# Patient Record
Sex: FEMALE | Race: WHITE | NOT HISPANIC OR LATINO | Employment: FULL TIME | URBAN - METROPOLITAN AREA
[De-identification: names, ages, dates, MRNs, and addresses within clinical notes are randomized per-mention and may not be internally consistent; named-entity substitution may affect disease eponyms.]

---

## 2017-05-29 ENCOUNTER — HOSPITAL ENCOUNTER (INPATIENT)
Facility: HOSPITAL | Age: 27
LOS: 2 days | Discharge: HOME | DRG: 050 | End: 2017-06-01
Attending: EMERGENCY MEDICINE | Admitting: INTERNAL MEDICINE
Payer: COMMERCIAL

## 2017-05-29 ENCOUNTER — APPOINTMENT (OUTPATIENT)
Dept: CT IMAGING | Facility: HOSPITAL | Age: 27
DRG: 050 | End: 2017-05-29
Payer: COMMERCIAL

## 2017-05-29 DIAGNOSIS — R51.9 HEADACHE: ICD-10-CM

## 2017-05-29 DIAGNOSIS — M43.6 NECK STIFFNESS: ICD-10-CM

## 2017-05-29 DIAGNOSIS — A41.9 SEPSIS (HCC): Primary | ICD-10-CM

## 2017-05-29 PROBLEM — R50.9 FEVER: Status: ACTIVE | Noted: 2017-05-29

## 2017-05-29 LAB
ALBUMIN SERPL BCP-MCNC: 3.6 G/DL (ref 3.5–5)
ALP SERPL-CCNC: 52 U/L (ref 46–116)
ALT SERPL W P-5'-P-CCNC: 17 U/L (ref 12–78)
ANION GAP SERPL CALCULATED.3IONS-SCNC: 13 MMOL/L (ref 4–13)
APPEARANCE CSF: NORMAL
AST SERPL W P-5'-P-CCNC: 15 U/L (ref 5–45)
ATRIAL RATE: 84 BPM
BASOPHILS # BLD AUTO: 0.01 THOUSANDS/ΜL (ref 0–0.1)
BASOPHILS NFR BLD AUTO: 0 % (ref 0–1)
BILIRUB SERPL-MCNC: 0.3 MG/DL (ref 0.2–1)
BUN SERPL-MCNC: 8 MG/DL (ref 5–25)
CALCIUM SERPL-MCNC: 8.8 MG/DL (ref 8.3–10.1)
CHLORIDE SERPL-SCNC: 104 MMOL/L (ref 100–108)
CLARITY, POC: CLEAR
CO2 SERPL-SCNC: 20 MMOL/L (ref 21–32)
COLOR, POC: YELLOW
CREAT SERPL-MCNC: 0.81 MG/DL (ref 0.6–1.3)
EOSINOPHIL # BLD AUTO: 0 THOUSAND/ΜL (ref 0–0.61)
EOSINOPHIL NFR BLD AUTO: 0 % (ref 0–6)
ERYTHROCYTE [DISTWIDTH] IN BLOOD BY AUTOMATED COUNT: 11.6 % (ref 11.6–15.1)
EXT BILIRUBIN, UA: NEGATIVE
EXT BLOOD URINE: NEGATIVE
EXT GLUCOSE, UA: NEGATIVE
EXT KETONES: NEGATIVE
EXT NITRITE, UA: NEGATIVE
EXT PH, UA: 6
EXT PROTEIN, UA: NEGATIVE
EXT SPECIFIC GRAVITY, UA: 1.01
EXT UROBILINOGEN: 0.2
FLUAV AG SPEC QL IA: NEGATIVE
FLUBV AG SPEC QL IA: NEGATIVE
GFR SERPL CREATININE-BSD FRML MDRD: >60 ML/MIN/1.73SQ M
GLUCOSE CSF-MCNC: 67 MG/DL (ref 50–80)
GLUCOSE SERPL-MCNC: 105 MG/DL (ref 65–140)
GRAM STN SPEC: NORMAL
HCG UR QL: NEGATIVE
HCT VFR BLD AUTO: 38.1 % (ref 34.8–46.1)
HGB BLD-MCNC: 12.8 G/DL (ref 11.5–15.4)
LACTATE SERPL-SCNC: 0.4 MMOL/L (ref 0.5–2)
LIPASE SERPL-CCNC: 99 U/L (ref 73–393)
LYMPHOCYTES # BLD AUTO: 0.67 THOUSANDS/ΜL (ref 0.6–4.47)
LYMPHOCYTES NFR BLD AUTO: 19 % (ref 14–44)
LYMPHOCYTES NFR CSF MANUAL: 50 %
MCH RBC QN AUTO: 29.5 PG (ref 26.8–34.3)
MCHC RBC AUTO-ENTMCNC: 33.6 G/DL (ref 31.4–37.4)
MCV RBC AUTO: 88 FL (ref 82–98)
MONOCYTES # BLD AUTO: 0.49 THOUSAND/ΜL (ref 0.17–1.22)
MONOCYTES NFR BLD AUTO: 14 % (ref 4–12)
NEUTROPHILS # BLD AUTO: 2.4 THOUSANDS/ΜL (ref 1.85–7.62)
NEUTROPHILS NFR CSF MANUAL: 50 %
NEUTS SEG NFR BLD AUTO: 67 % (ref 43–75)
NRBC BLD AUTO-RTO: 0 /100 WBCS
P AXIS: 68 DEGREES
PLATELET # BLD AUTO: 211 THOUSANDS/UL (ref 149–390)
PMV BLD AUTO: 8.5 FL (ref 8.9–12.7)
POTASSIUM SERPL-SCNC: 3.5 MMOL/L (ref 3.5–5.3)
PR INTERVAL: 140 MS
PROT CSF-MCNC: 23 MG/DL (ref 15–45)
PROT SERPL-MCNC: 6.8 G/DL (ref 6.4–8.2)
QRS AXIS: 77 DEGREES
QRSD INTERVAL: 86 MS
QT INTERVAL: 386 MS
QTC INTERVAL: 456 MS
RBC # BLD AUTO: 4.34 MILLION/UL (ref 3.81–5.12)
RBC # CSF MANUAL: 0 UL (ref 0–10)
SODIUM SERPL-SCNC: 137 MMOL/L (ref 136–145)
T WAVE AXIS: 56 DEGREES
TOTAL CELLS COUNTED BLD: NO
TOTAL CELLS COUNTED SPEC: 2
TUBE # CSF: 4
VENTRICULAR RATE: 84 BPM
WBC # BLD AUTO: 3.58 THOUSAND/UL (ref 4.31–10.16)
WBC # BLD EST: NEGATIVE 10*3/UL
WBC # CSF AUTO: 1 /UL (ref 0–5)

## 2017-05-29 PROCEDURE — 83690 ASSAY OF LIPASE: CPT | Performed by: EMERGENCY MEDICINE

## 2017-05-29 PROCEDURE — 96365 THER/PROPH/DIAG IV INF INIT: CPT

## 2017-05-29 PROCEDURE — 87070 CULTURE OTHR SPECIMN AEROBIC: CPT | Performed by: EMERGENCY MEDICINE

## 2017-05-29 PROCEDURE — 36415 COLL VENOUS BLD VENIPUNCTURE: CPT | Performed by: EMERGENCY MEDICINE

## 2017-05-29 PROCEDURE — 87529 HSV DNA AMP PROBE: CPT | Performed by: EMERGENCY MEDICINE

## 2017-05-29 PROCEDURE — 84157 ASSAY OF PROTEIN OTHER: CPT | Performed by: EMERGENCY MEDICINE

## 2017-05-29 PROCEDURE — 99285 EMERGENCY DEPT VISIT HI MDM: CPT

## 2017-05-29 PROCEDURE — 70450 CT HEAD/BRAIN W/O DYE: CPT

## 2017-05-29 PROCEDURE — 87653 STREP B DNA AMP PROBE: CPT | Performed by: EMERGENCY MEDICINE

## 2017-05-29 PROCEDURE — 87798 DETECT AGENT NOS DNA AMP: CPT | Performed by: EMERGENCY MEDICINE

## 2017-05-29 PROCEDURE — 96367 TX/PROPH/DG ADDL SEQ IV INF: CPT

## 2017-05-29 PROCEDURE — 87040 BLOOD CULTURE FOR BACTERIA: CPT | Performed by: EMERGENCY MEDICINE

## 2017-05-29 PROCEDURE — 87400 INFLUENZA A/B EACH AG IA: CPT | Performed by: EMERGENCY MEDICINE

## 2017-05-29 PROCEDURE — 96375 TX/PRO/DX INJ NEW DRUG ADDON: CPT

## 2017-05-29 PROCEDURE — 87496 CYTOMEG DNA AMP PROBE: CPT | Performed by: EMERGENCY MEDICINE

## 2017-05-29 PROCEDURE — 87532 HHV-6 DNA AMP PROBE: CPT | Performed by: EMERGENCY MEDICINE

## 2017-05-29 PROCEDURE — 009U3ZX DRAINAGE OF SPINAL CANAL, PERCUTANEOUS APPROACH, DIAGNOSTIC: ICD-10-PCS | Performed by: EMERGENCY MEDICINE

## 2017-05-29 PROCEDURE — 89050 BODY FLUID CELL COUNT: CPT | Performed by: EMERGENCY MEDICINE

## 2017-05-29 PROCEDURE — 82945 GLUCOSE OTHER FLUID: CPT | Performed by: EMERGENCY MEDICINE

## 2017-05-29 PROCEDURE — 85025 COMPLETE CBC W/AUTO DIFF WBC: CPT | Performed by: EMERGENCY MEDICINE

## 2017-05-29 PROCEDURE — 81025 URINE PREGNANCY TEST: CPT | Performed by: EMERGENCY MEDICINE

## 2017-05-29 PROCEDURE — 81002 URINALYSIS NONAUTO W/O SCOPE: CPT | Performed by: EMERGENCY MEDICINE

## 2017-05-29 PROCEDURE — 93005 ELECTROCARDIOGRAM TRACING: CPT | Performed by: EMERGENCY MEDICINE

## 2017-05-29 PROCEDURE — 83605 ASSAY OF LACTIC ACID: CPT | Performed by: EMERGENCY MEDICINE

## 2017-05-29 PROCEDURE — 80053 COMPREHEN METABOLIC PANEL: CPT | Performed by: EMERGENCY MEDICINE

## 2017-05-29 PROCEDURE — 89051 BODY FLUID CELL COUNT: CPT | Performed by: EMERGENCY MEDICINE

## 2017-05-29 PROCEDURE — 87498 ENTEROVIRUS PROBE&REVRS TRNS: CPT | Performed by: EMERGENCY MEDICINE

## 2017-05-29 RX ORDER — KETOROLAC TROMETHAMINE 30 MG/ML
15 INJECTION, SOLUTION INTRAMUSCULAR; INTRAVENOUS ONCE
Status: COMPLETED | OUTPATIENT
Start: 2017-05-29 | End: 2017-05-29

## 2017-05-29 RX ORDER — CALCIUM CARBONATE 200(500)MG
1000 TABLET,CHEWABLE ORAL DAILY PRN
Status: DISCONTINUED | OUTPATIENT
Start: 2017-05-29 | End: 2017-06-01 | Stop reason: HOSPADM

## 2017-05-29 RX ORDER — IBUPROFEN 400 MG/1
TABLET ORAL
Status: COMPLETED
Start: 2017-05-29 | End: 2017-05-29

## 2017-05-29 RX ORDER — DIPHENHYDRAMINE HYDROCHLORIDE 50 MG/ML
25 INJECTION INTRAMUSCULAR; INTRAVENOUS ONCE
Status: COMPLETED | OUTPATIENT
Start: 2017-05-29 | End: 2017-05-29

## 2017-05-29 RX ORDER — VANCOMYCIN HYDROCHLORIDE 1 G/200ML
15 INJECTION, SOLUTION INTRAVENOUS ONCE
Status: COMPLETED | OUTPATIENT
Start: 2017-05-29 | End: 2017-05-29

## 2017-05-29 RX ORDER — MORPHINE SULFATE 4 MG/ML
INJECTION, SOLUTION INTRAMUSCULAR; INTRAVENOUS
Status: COMPLETED
Start: 2017-05-29 | End: 2017-05-29

## 2017-05-29 RX ORDER — LIDOCAINE HYDROCHLORIDE 20 MG/ML
10 INJECTION, SOLUTION EPIDURAL; INFILTRATION; INTRACAUDAL; PERINEURAL ONCE
Status: COMPLETED | OUTPATIENT
Start: 2017-05-29 | End: 2017-05-29

## 2017-05-29 RX ORDER — ONDANSETRON 2 MG/ML
4 INJECTION INTRAMUSCULAR; INTRAVENOUS EVERY 6 HOURS PRN
Status: DISCONTINUED | OUTPATIENT
Start: 2017-05-29 | End: 2017-06-01 | Stop reason: HOSPADM

## 2017-05-29 RX ORDER — MIDAZOLAM HYDROCHLORIDE 1 MG/ML
2 INJECTION INTRAMUSCULAR; INTRAVENOUS ONCE
Status: COMPLETED | OUTPATIENT
Start: 2017-05-29 | End: 2017-05-29

## 2017-05-29 RX ORDER — MIDAZOLAM HYDROCHLORIDE 1 MG/ML
INJECTION INTRAMUSCULAR; INTRAVENOUS
Status: COMPLETED
Start: 2017-05-29 | End: 2017-05-29

## 2017-05-29 RX ORDER — ACETAMINOPHEN 325 MG/1
650 TABLET ORAL EVERY 6 HOURS PRN
Status: DISCONTINUED | OUTPATIENT
Start: 2017-05-29 | End: 2017-05-31

## 2017-05-29 RX ORDER — IBUPROFEN 400 MG/1
400 TABLET ORAL ONCE
Status: COMPLETED | OUTPATIENT
Start: 2017-05-29 | End: 2017-05-29

## 2017-05-29 RX ORDER — VANCOMYCIN HYDROCHLORIDE 1 G/200ML
15 INJECTION, SOLUTION INTRAVENOUS EVERY 12 HOURS
Status: DISCONTINUED | OUTPATIENT
Start: 2017-05-30 | End: 2017-05-30

## 2017-05-29 RX ORDER — MORPHINE SULFATE 10 MG/ML
6 INJECTION, SOLUTION INTRAMUSCULAR; INTRAVENOUS ONCE
Status: COMPLETED | OUTPATIENT
Start: 2017-05-29 | End: 2017-05-29

## 2017-05-29 RX ORDER — IBUPROFEN 400 MG/1
400 TABLET ORAL EVERY 6 HOURS PRN
Status: DISCONTINUED | OUTPATIENT
Start: 2017-05-29 | End: 2017-05-30

## 2017-05-29 RX ORDER — FENTANYL CITRATE 50 UG/ML
INJECTION, SOLUTION INTRAMUSCULAR; INTRAVENOUS
Status: DISCONTINUED
Start: 2017-05-29 | End: 2017-05-29 | Stop reason: WASHOUT

## 2017-05-29 RX ORDER — SODIUM CHLORIDE 9 MG/ML
100 INJECTION, SOLUTION INTRAVENOUS CONTINUOUS
Status: DISCONTINUED | OUTPATIENT
Start: 2017-05-29 | End: 2017-05-31

## 2017-05-29 RX ORDER — ACETAMINOPHEN 325 MG/1
975 TABLET ORAL ONCE
Status: COMPLETED | OUTPATIENT
Start: 2017-05-29 | End: 2017-05-29

## 2017-05-29 RX ORDER — MORPHINE SULFATE 2 MG/ML
2 INJECTION, SOLUTION INTRAMUSCULAR; INTRAVENOUS
Status: DISCONTINUED | OUTPATIENT
Start: 2017-05-29 | End: 2017-05-30

## 2017-05-29 RX ORDER — ACETAMINOPHEN 325 MG/1
650 TABLET ORAL EVERY 6 HOURS PRN
Status: DISCONTINUED | OUTPATIENT
Start: 2017-05-29 | End: 2017-05-29

## 2017-05-29 RX ORDER — MORPHINE SULFATE 2 MG/ML
INJECTION, SOLUTION INTRAMUSCULAR; INTRAVENOUS
Status: COMPLETED
Start: 2017-05-29 | End: 2017-05-29

## 2017-05-29 RX ORDER — DOCUSATE SODIUM 100 MG/1
100 CAPSULE, LIQUID FILLED ORAL 2 TIMES DAILY
Status: DISCONTINUED | OUTPATIENT
Start: 2017-05-30 | End: 2017-06-01 | Stop reason: HOSPADM

## 2017-05-29 RX ADMIN — SODIUM CHLORIDE 125 ML/HR: 0.9 INJECTION, SOLUTION INTRAVENOUS at 23:29

## 2017-05-29 RX ADMIN — LIDOCAINE HYDROCHLORIDE 10 ML: 20 INJECTION, SOLUTION EPIDURAL; INFILTRATION; INTRACAUDAL; PERINEURAL at 20:08

## 2017-05-29 RX ADMIN — KETOROLAC TROMETHAMINE 15 MG: 30 INJECTION, SOLUTION INTRAMUSCULAR at 19:20

## 2017-05-29 RX ADMIN — MORPHINE SULFATE 2 MG: 2 INJECTION, SOLUTION INTRAMUSCULAR; INTRAVENOUS at 20:37

## 2017-05-29 RX ADMIN — IBUPROFEN 400 MG: 400 TABLET ORAL at 18:32

## 2017-05-29 RX ADMIN — CEFTRIAXONE 1000 MG: 1 INJECTION, POWDER, FOR SOLUTION INTRAMUSCULAR; INTRAVENOUS at 20:56

## 2017-05-29 RX ADMIN — DIPHENHYDRAMINE HYDROCHLORIDE 25 MG: 50 INJECTION, SOLUTION INTRAMUSCULAR; INTRAVENOUS at 22:31

## 2017-05-29 RX ADMIN — MIDAZOLAM HYDROCHLORIDE 2 MG: 1 INJECTION INTRAMUSCULAR; INTRAVENOUS at 20:38

## 2017-05-29 RX ADMIN — ONDANSETRON 4 MG: 2 INJECTION INTRAMUSCULAR; INTRAVENOUS at 23:28

## 2017-05-29 RX ADMIN — MORPHINE SULFATE: 4 INJECTION, SOLUTION INTRAMUSCULAR; INTRAVENOUS at 20:37

## 2017-05-29 RX ADMIN — MORPHINE SULFATE 6 MG: 10 INJECTION, SOLUTION INTRAMUSCULAR; INTRAVENOUS at 20:39

## 2017-05-29 RX ADMIN — MIDAZOLAM 2 MG: 1 INJECTION INTRAMUSCULAR; INTRAVENOUS at 20:38

## 2017-05-29 RX ADMIN — ACETAMINOPHEN 975 MG: 325 TABLET ORAL at 19:21

## 2017-05-29 RX ADMIN — VANCOMYCIN HYDROCHLORIDE 1000 MG: 1 INJECTION, SOLUTION INTRAVENOUS at 21:38

## 2017-05-30 LAB
ANION GAP SERPL CALCULATED.3IONS-SCNC: 8 MMOL/L (ref 4–13)
BUN SERPL-MCNC: 11 MG/DL (ref 5–25)
CALCIUM SERPL-MCNC: 8.5 MG/DL (ref 8.3–10.1)
CHLORIDE SERPL-SCNC: 109 MMOL/L (ref 100–108)
CO2 SERPL-SCNC: 24 MMOL/L (ref 21–32)
CREAT SERPL-MCNC: 0.82 MG/DL (ref 0.6–1.3)
ERYTHROCYTE [DISTWIDTH] IN BLOOD BY AUTOMATED COUNT: 11.8 % (ref 11.6–15.1)
FLUAV AG SPEC QL: NORMAL
FLUBV AG SPEC QL: NORMAL
GFR SERPL CREATININE-BSD FRML MDRD: >60 ML/MIN/1.73SQ M
GLUCOSE P FAST SERPL-MCNC: 104 MG/DL (ref 65–99)
GLUCOSE SERPL-MCNC: 104 MG/DL (ref 65–140)
HCT VFR BLD AUTO: 38.6 % (ref 34.8–46.1)
HGB BLD-MCNC: 12.7 G/DL (ref 11.5–15.4)
MCH RBC QN AUTO: 30.2 PG (ref 26.8–34.3)
MCHC RBC AUTO-ENTMCNC: 32.9 G/DL (ref 31.4–37.4)
MCV RBC AUTO: 92 FL (ref 82–98)
PLATELET # BLD AUTO: 160 THOUSANDS/UL (ref 149–390)
PMV BLD AUTO: 8.6 FL (ref 8.9–12.7)
POTASSIUM SERPL-SCNC: 4.2 MMOL/L (ref 3.5–5.3)
RBC # BLD AUTO: 4.21 MILLION/UL (ref 3.81–5.12)
RSV B RNA SPEC QL NAA+PROBE: NORMAL
SODIUM SERPL-SCNC: 141 MMOL/L (ref 136–145)
WBC # BLD AUTO: 2.93 THOUSAND/UL (ref 4.31–10.16)

## 2017-05-30 PROCEDURE — 85027 COMPLETE CBC AUTOMATED: CPT | Performed by: PHYSICIAN ASSISTANT

## 2017-05-30 PROCEDURE — 80048 BASIC METABOLIC PNL TOTAL CA: CPT | Performed by: PHYSICIAN ASSISTANT

## 2017-05-30 RX ORDER — DIPHENHYDRAMINE HYDROCHLORIDE 50 MG/ML
25 INJECTION INTRAMUSCULAR; INTRAVENOUS ONCE
Status: COMPLETED | OUTPATIENT
Start: 2017-05-30 | End: 2017-05-30

## 2017-05-30 RX ORDER — MORPHINE SULFATE 2 MG/ML
2 INJECTION, SOLUTION INTRAMUSCULAR; INTRAVENOUS EVERY 6 HOURS PRN
Status: DISCONTINUED | OUTPATIENT
Start: 2017-05-30 | End: 2017-05-31

## 2017-05-30 RX ORDER — KETOROLAC TROMETHAMINE 30 MG/ML
15 INJECTION, SOLUTION INTRAMUSCULAR; INTRAVENOUS EVERY 6 HOURS PRN
Status: DISCONTINUED | OUTPATIENT
Start: 2017-05-30 | End: 2017-05-31

## 2017-05-30 RX ADMIN — ONDANSETRON 4 MG: 2 INJECTION INTRAMUSCULAR; INTRAVENOUS at 17:35

## 2017-05-30 RX ADMIN — ENOXAPARIN SODIUM 40 MG: 40 INJECTION SUBCUTANEOUS at 09:08

## 2017-05-30 RX ADMIN — CEFTRIAXONE 1000 MG: 1 INJECTION, POWDER, FOR SOLUTION INTRAMUSCULAR; INTRAVENOUS at 21:32

## 2017-05-30 RX ADMIN — SODIUM CHLORIDE 100 ML/HR: 0.9 INJECTION, SOLUTION INTRAVENOUS at 21:32

## 2017-05-30 RX ADMIN — IBUPROFEN 400 MG: 400 TABLET ORAL at 05:43

## 2017-05-30 RX ADMIN — MORPHINE SULFATE 2 MG: 2 INJECTION, SOLUTION INTRAMUSCULAR; INTRAVENOUS at 21:41

## 2017-05-30 RX ADMIN — DOCUSATE SODIUM 100 MG: 100 CAPSULE, LIQUID FILLED ORAL at 17:35

## 2017-05-30 RX ADMIN — DIPHENHYDRAMINE HYDROCHLORIDE 25 MG: 50 INJECTION, SOLUTION INTRAMUSCULAR; INTRAVENOUS at 22:55

## 2017-05-30 RX ADMIN — ONDANSETRON 4 MG: 2 INJECTION INTRAMUSCULAR; INTRAVENOUS at 08:24

## 2017-05-30 RX ADMIN — ACETAMINOPHEN 650 MG: 325 TABLET ORAL at 07:26

## 2017-05-30 RX ADMIN — KETOROLAC TROMETHAMINE 15 MG: 30 INJECTION, SOLUTION INTRAMUSCULAR at 17:34

## 2017-05-30 RX ADMIN — SODIUM CHLORIDE 125 ML/HR: 0.9 INJECTION, SOLUTION INTRAVENOUS at 07:45

## 2017-05-30 RX ADMIN — VANCOMYCIN HYDROCHLORIDE 1500 MG: 1 INJECTION, POWDER, LYOPHILIZED, FOR SOLUTION INTRAVENOUS at 22:56

## 2017-05-30 RX ADMIN — DIPHENHYDRAMINE HYDROCHLORIDE 25 MG: 50 INJECTION, SOLUTION INTRAMUSCULAR; INTRAVENOUS at 09:39

## 2017-05-30 RX ADMIN — ACETAMINOPHEN 650 MG: 325 TABLET ORAL at 14:08

## 2017-05-30 RX ADMIN — VANCOMYCIN HYDROCHLORIDE 1500 MG: 1 INJECTION, POWDER, LYOPHILIZED, FOR SOLUTION INTRAVENOUS at 09:07

## 2017-05-31 ENCOUNTER — APPOINTMENT (INPATIENT)
Dept: MRI IMAGING | Facility: HOSPITAL | Age: 27
DRG: 050 | End: 2017-05-31
Payer: COMMERCIAL

## 2017-05-31 LAB
ANION GAP SERPL CALCULATED.3IONS-SCNC: 10 MMOL/L (ref 4–13)
BUN SERPL-MCNC: 12 MG/DL (ref 5–25)
C GATTII+NEOFOR DNA CSF QL NAA+NON-PROBE: NOT DETECTED
CALCIUM SERPL-MCNC: 8.3 MG/DL (ref 8.3–10.1)
CHLORIDE SERPL-SCNC: 108 MMOL/L (ref 100–108)
CMV DNA CSF QL NAA+NON-PROBE: NOT DETECTED
CO2 SERPL-SCNC: 22 MMOL/L (ref 21–32)
CREAT SERPL-MCNC: 0.68 MG/DL (ref 0.6–1.3)
E COLI K1 DNA CSF QL NAA+NON-PROBE: NOT DETECTED
ERYTHROCYTE [DISTWIDTH] IN BLOOD BY AUTOMATED COUNT: 11.9 % (ref 11.6–15.1)
EV RNA CSF QL NAA+NON-PROBE: NOT DETECTED
GFR SERPL CREATININE-BSD FRML MDRD: >60 ML/MIN/1.73SQ M
GLUCOSE SERPL-MCNC: 98 MG/DL (ref 65–140)
GP B STREP DNA CSF QL NAA+NON-PROBE: NOT DETECTED
HAEM INFLU DNA CSF QL NAA+NON-PROBE: NOT DETECTED
HCT VFR BLD AUTO: 37 % (ref 34.8–46.1)
HGB BLD-MCNC: 12.2 G/DL (ref 11.5–15.4)
HHV6 DNA CSF QL NAA+NON-PROBE: NOT DETECTED
HSV1 DNA CSF QL NAA+NON-PROBE: NOT DETECTED
HSV2 DNA CSF QL NAA+NON-PROBE: NOT DETECTED
L MONOCYTOG DNA CSF QL NAA+NON-PROBE: NOT DETECTED
MCH RBC QN AUTO: 29.9 PG (ref 26.8–34.3)
MCHC RBC AUTO-ENTMCNC: 33 G/DL (ref 31.4–37.4)
MCV RBC AUTO: 91 FL (ref 82–98)
N MEN DNA CSF QL NAA+NON-PROBE: NOT DETECTED
PARECHOVIRUS A RNA CSF QL NAA+NON-PROBE: NOT DETECTED
PLATELET # BLD AUTO: 147 THOUSANDS/UL (ref 149–390)
PMV BLD AUTO: 8.8 FL (ref 8.9–12.7)
POTASSIUM SERPL-SCNC: 4.1 MMOL/L (ref 3.5–5.3)
RBC # BLD AUTO: 4.08 MILLION/UL (ref 3.81–5.12)
S PNEUM DNA CSF QL NAA+NON-PROBE: NOT DETECTED
SODIUM SERPL-SCNC: 140 MMOL/L (ref 136–145)
VZV DNA CSF QL NAA+NON-PROBE: NOT DETECTED
WBC # BLD AUTO: 3.63 THOUSAND/UL (ref 4.31–10.16)

## 2017-05-31 PROCEDURE — 70553 MRI BRAIN STEM W/O & W/DYE: CPT

## 2017-05-31 PROCEDURE — A9585 GADOBUTROL INJECTION: HCPCS | Performed by: INTERNAL MEDICINE

## 2017-05-31 PROCEDURE — 80048 BASIC METABOLIC PNL TOTAL CA: CPT | Performed by: INTERNAL MEDICINE

## 2017-05-31 PROCEDURE — 85027 COMPLETE CBC AUTOMATED: CPT | Performed by: INTERNAL MEDICINE

## 2017-05-31 RX ORDER — KETOROLAC TROMETHAMINE 10 MG/1
10 TABLET, FILM COATED ORAL EVERY 6 HOURS PRN
Status: DISCONTINUED | OUTPATIENT
Start: 2017-05-31 | End: 2017-06-01 | Stop reason: HOSPADM

## 2017-05-31 RX ORDER — ACETAMINOPHEN 325 MG/1
650 TABLET ORAL EVERY 6 HOURS PRN
Status: DISCONTINUED | OUTPATIENT
Start: 2017-05-31 | End: 2017-06-01 | Stop reason: HOSPADM

## 2017-05-31 RX ADMIN — ENOXAPARIN SODIUM 40 MG: 40 INJECTION SUBCUTANEOUS at 10:06

## 2017-05-31 RX ADMIN — KETOROLAC TROMETHAMINE 15 MG: 30 INJECTION, SOLUTION INTRAMUSCULAR at 05:48

## 2017-05-31 RX ADMIN — DOCUSATE SODIUM 100 MG: 100 CAPSULE, LIQUID FILLED ORAL at 10:06

## 2017-05-31 RX ADMIN — ACETAMINOPHEN 650 MG: 325 TABLET ORAL at 10:11

## 2017-05-31 RX ADMIN — GADOBUTROL 7 ML: 604.72 INJECTION INTRAVENOUS at 16:21

## 2017-05-31 RX ADMIN — KETOROLAC TROMETHAMINE 10 MG: 10 TABLET, FILM COATED ORAL at 16:41

## 2017-05-31 RX ADMIN — MENTHOL 5.4 MG: 5.4 LOZENGE ORAL at 23:09

## 2017-06-01 VITALS
HEART RATE: 72 BPM | TEMPERATURE: 98.4 F | HEIGHT: 65 IN | WEIGHT: 158.29 LBS | RESPIRATION RATE: 18 BRPM | OXYGEN SATURATION: 98 % | SYSTOLIC BLOOD PRESSURE: 101 MMHG | BODY MASS INDEX: 26.37 KG/M2 | DIASTOLIC BLOOD PRESSURE: 55 MMHG

## 2017-06-01 LAB
ANISOCYTOSIS BLD QL SMEAR: PRESENT
BASOPHILS # BLD MANUAL: 0 THOUSAND/UL (ref 0–0.1)
BASOPHILS NFR MAR MANUAL: 0 % (ref 0–1)
EOSINOPHIL # BLD MANUAL: 0 THOUSAND/UL (ref 0–0.4)
EOSINOPHIL NFR BLD MANUAL: 0 % (ref 0–6)
ERYTHROCYTE [DISTWIDTH] IN BLOOD BY AUTOMATED COUNT: 11.7 % (ref 11.6–15.1)
HCT VFR BLD AUTO: 38.1 % (ref 34.8–46.1)
HGB BLD-MCNC: 12.9 G/DL (ref 11.5–15.4)
LYMPHOCYTES # BLD AUTO: 2.88 THOUSAND/UL (ref 0.6–4.47)
LYMPHOCYTES # BLD AUTO: 63 % (ref 14–44)
MCH RBC QN AUTO: 30.2 PG (ref 26.8–34.3)
MCHC RBC AUTO-ENTMCNC: 33.9 G/DL (ref 31.4–37.4)
MCV RBC AUTO: 89 FL (ref 82–98)
MONOCYTES # BLD AUTO: 0.37 THOUSAND/UL (ref 0–1.22)
MONOCYTES NFR BLD: 8 % (ref 4–12)
NEUTROPHILS # BLD MANUAL: 1.14 THOUSAND/UL (ref 1.85–7.62)
NEUTS BAND NFR BLD MANUAL: 1 % (ref 0–8)
NEUTS SEG NFR BLD AUTO: 24 % (ref 43–75)
NRBC BLD AUTO-RTO: 0 /100 WBCS
PLATELET # BLD AUTO: 172 THOUSANDS/UL (ref 149–390)
PLATELET BLD QL SMEAR: ABNORMAL
PMV BLD AUTO: 8.5 FL (ref 8.9–12.7)
RBC # BLD AUTO: 4.27 MILLION/UL (ref 3.81–5.12)
TOTAL CELLS COUNTED SPEC: 100
VARIANT LYMPHS # BLD AUTO: 4 %
WBC # BLD AUTO: 4.57 THOUSAND/UL (ref 4.31–10.16)

## 2017-06-01 PROCEDURE — 85027 COMPLETE CBC AUTOMATED: CPT | Performed by: INTERNAL MEDICINE

## 2017-06-01 PROCEDURE — 85007 BL SMEAR W/DIFF WBC COUNT: CPT | Performed by: INTERNAL MEDICINE

## 2017-06-01 RX ORDER — AMOXICILLIN AND CLAVULANATE POTASSIUM 875; 125 MG/1; MG/1
1 TABLET, FILM COATED ORAL 2 TIMES DAILY
Qty: 14 TABLET | Refills: 0 | Status: SHIPPED | OUTPATIENT
Start: 2017-06-01 | End: 2017-06-08

## 2017-06-01 RX ADMIN — ACETAMINOPHEN 650 MG: 325 TABLET ORAL at 09:21

## 2017-06-01 RX ADMIN — ENOXAPARIN SODIUM 40 MG: 40 INJECTION SUBCUTANEOUS at 09:21

## 2017-06-01 RX ADMIN — MENTHOL 5.4 MG: 5.4 LOZENGE ORAL at 09:21

## 2017-06-02 LAB — BACTERIA CSF CULT: NO GROWTH

## 2017-06-04 LAB
BACTERIA BLD CULT: NORMAL
BACTERIA BLD CULT: NORMAL

## 2017-08-03 ENCOUNTER — APPOINTMENT (EMERGENCY)
Dept: RADIOLOGY | Facility: HOSPITAL | Age: 27
End: 2017-08-03
Payer: COMMERCIAL

## 2017-08-03 ENCOUNTER — HOSPITAL ENCOUNTER (EMERGENCY)
Facility: HOSPITAL | Age: 27
Discharge: HOME | End: 2017-08-03
Attending: EMERGENCY MEDICINE | Admitting: EMERGENCY MEDICINE
Payer: COMMERCIAL

## 2017-08-03 VITALS
RESPIRATION RATE: 16 BRPM | HEART RATE: 66 BPM | SYSTOLIC BLOOD PRESSURE: 115 MMHG | OXYGEN SATURATION: 100 % | TEMPERATURE: 98.6 F | DIASTOLIC BLOOD PRESSURE: 64 MMHG | BODY MASS INDEX: 24.96 KG/M2 | WEIGHT: 150 LBS

## 2017-08-03 DIAGNOSIS — R07.81 RIB PAIN ON RIGHT SIDE: Primary | ICD-10-CM

## 2017-08-03 LAB
ANION GAP BLD CALC-SCNC: 15 MMOL/L (ref 4–13)
ATRIAL RATE: 62 BPM
BUN BLD-MCNC: 13 MG/DL (ref 5–25)
CA-I BLD-SCNC: 1.14 MMOL/L (ref 1.12–1.32)
CHLORIDE BLD-SCNC: 107 MMOL/L (ref 100–108)
CLARITY, POC: CLEAR
COLOR, POC: YELLOW
CREAT BLD-MCNC: 0.6 MG/DL (ref 0.6–1.3)
DEPRECATED D DIMER PPP: <270 NG/ML (FEU) (ref 0–424)
EXT BILIRUBIN, UA: NORMAL
EXT BLOOD URINE: NORMAL
EXT GLUCOSE, UA: NORMAL
EXT KETONES: NORMAL
EXT NITRITE, UA: NORMAL
EXT PROTEIN, UA: NORMAL
EXT SPECIFIC GRAVITY, UA: 1.02
GFR SERPL CREATININE-BSD FRML MDRD: 125 ML/MIN/1.73SQ M
GLUCOSE SERPL-MCNC: 78 MG/DL (ref 65–140)
HCG UR QL: NORMAL
HCT VFR BLD CALC: 38 % (ref 34.8–46.1)
HGB BLDA-MCNC: 12.9 G/DL (ref 11.5–15.4)
P AXIS: 77 DEGREES
PCO2 BLD: 22 MMOL/L (ref 21–32)
POTASSIUM BLD-SCNC: 3.8 MMOL/L (ref 3.5–5.3)
PR INTERVAL: 126 MS
QRS AXIS: 73 DEGREES
QRSD INTERVAL: 86 MS
QT INTERVAL: 430 MS
QTC INTERVAL: 436 MS
SODIUM BLD-SCNC: 140 MMOL/L (ref 136–145)
SPECIMEN SOURCE: ABNORMAL
T WAVE AXIS: 63 DEGREES
VENTRICULAR RATE: 62 BPM
WBC # BLD EST: NORMAL 10*3/UL

## 2017-08-03 PROCEDURE — 85014 HEMATOCRIT: CPT

## 2017-08-03 PROCEDURE — 93005 ELECTROCARDIOGRAM TRACING: CPT | Performed by: EMERGENCY MEDICINE

## 2017-08-03 PROCEDURE — 85379 FIBRIN DEGRADATION QUANT: CPT | Performed by: EMERGENCY MEDICINE

## 2017-08-03 PROCEDURE — 36415 COLL VENOUS BLD VENIPUNCTURE: CPT | Performed by: EMERGENCY MEDICINE

## 2017-08-03 PROCEDURE — 81002 URINALYSIS NONAUTO W/O SCOPE: CPT | Performed by: EMERGENCY MEDICINE

## 2017-08-03 PROCEDURE — 80047 BASIC METABLC PNL IONIZED CA: CPT

## 2017-08-03 PROCEDURE — 71020 HB CHEST X-RAY 2VW FRONTAL&LATL: CPT

## 2017-08-03 PROCEDURE — 81025 URINE PREGNANCY TEST: CPT | Performed by: EMERGENCY MEDICINE

## 2017-08-03 PROCEDURE — 99284 EMERGENCY DEPT VISIT MOD MDM: CPT

## 2017-08-03 RX ORDER — NAPROXEN 500 MG/1
500 TABLET ORAL 2 TIMES DAILY WITH MEALS
Qty: 20 TABLET | Refills: 0 | Status: SHIPPED | OUTPATIENT
Start: 2017-08-03 | End: 2018-08-03

## 2017-08-03 RX ORDER — METHOCARBAMOL 500 MG/1
500 TABLET, FILM COATED ORAL 4 TIMES DAILY PRN
Qty: 20 TABLET | Refills: 0 | Status: SHIPPED | OUTPATIENT
Start: 2017-08-03 | End: 2017-08-13

## 2017-08-16 ENCOUNTER — HOSPITAL ENCOUNTER (EMERGENCY)
Facility: HOSPITAL | Age: 27
Discharge: HOME | End: 2017-08-16
Attending: EMERGENCY MEDICINE | Admitting: EMERGENCY MEDICINE
Payer: COMMERCIAL

## 2017-08-16 VITALS
BODY MASS INDEX: 24.16 KG/M2 | RESPIRATION RATE: 18 BRPM | HEIGHT: 65 IN | SYSTOLIC BLOOD PRESSURE: 142 MMHG | TEMPERATURE: 97.4 F | HEART RATE: 97 BPM | DIASTOLIC BLOOD PRESSURE: 80 MMHG | OXYGEN SATURATION: 98 % | WEIGHT: 145 LBS

## 2017-08-16 DIAGNOSIS — B02.9 SHINGLES: Primary | ICD-10-CM

## 2017-08-16 PROCEDURE — 99283 EMERGENCY DEPT VISIT LOW MDM: CPT

## 2017-08-16 RX ORDER — ACYCLOVIR 800 MG/1
800 TABLET ORAL
Qty: 35 TABLET | Refills: 0 | Status: SHIPPED | OUTPATIENT
Start: 2017-08-16 | End: 2017-08-23

## 2019-06-25 ENCOUNTER — HOSPITAL ENCOUNTER (EMERGENCY)
Facility: HOSPITAL | Age: 29
Discharge: HOME | End: 2019-06-25
Attending: EMERGENCY MEDICINE | Admitting: EMERGENCY MEDICINE
Payer: COMMERCIAL

## 2019-06-25 VITALS
SYSTOLIC BLOOD PRESSURE: 154 MMHG | RESPIRATION RATE: 18 BRPM | BODY MASS INDEX: 30.66 KG/M2 | HEART RATE: 98 BPM | OXYGEN SATURATION: 98 % | TEMPERATURE: 98.3 F | DIASTOLIC BLOOD PRESSURE: 97 MMHG | HEIGHT: 65 IN | WEIGHT: 184 LBS

## 2019-06-25 DIAGNOSIS — M54.41 ACUTE BILATERAL LOW BACK PAIN WITH BILATERAL SCIATICA: Primary | ICD-10-CM

## 2019-06-25 DIAGNOSIS — M54.16 LUMBAR RADICULOPATHY: ICD-10-CM

## 2019-06-25 DIAGNOSIS — M54.42 ACUTE BILATERAL LOW BACK PAIN WITH BILATERAL SCIATICA: Primary | ICD-10-CM

## 2019-06-25 LAB
EXT PREG TEST URINE: NEGATIVE
EXT. CONTROL ED NAV: NORMAL

## 2019-06-25 PROCEDURE — 99283 EMERGENCY DEPT VISIT LOW MDM: CPT | Performed by: PHYSICIAN ASSISTANT

## 2019-06-25 PROCEDURE — 99283 EMERGENCY DEPT VISIT LOW MDM: CPT

## 2019-06-25 PROCEDURE — 81025 URINE PREGNANCY TEST: CPT | Performed by: PHYSICIAN ASSISTANT

## 2019-06-25 RX ORDER — LIDOCAINE 50 MG/G
1 PATCH TOPICAL 2 TIMES DAILY
Qty: 30 PATCH | Refills: 0 | Status: SHIPPED | OUTPATIENT
Start: 2019-06-25

## 2019-06-25 RX ORDER — LIDOCAINE 50 MG/G
1 PATCH TOPICAL ONCE
Status: DISCONTINUED | OUTPATIENT
Start: 2019-06-25 | End: 2019-06-25

## 2019-06-25 NOTE — DISCHARGE INSTRUCTIONS
Follow up with PCP for referral to orthopedic spine or physiatry if your symptoms do not improve over the next 7-10 days  Take 650mg of tylenol and ibuprofen 400mg together every 8hrs for pain relief

## 2019-06-25 NOTE — ED PROVIDER NOTES
History  Chief Complaint   Patient presents with    Back Pain     lower back pain since january, got worse this weekend after using a shop vac and radiating down both legs     Pt presents to the ED for evaluation of lower back pain that began several days ago w/o any specific injury  Has been taking ibuprofen w/ some relief  Pt states that the pain got worse today after she drove to [address] [place] [address] to  her nephew  She now is having increased pain in her low back that radiates into her legs bilaterally to her knees  Her pain is worse w/ movement and better w/ rest  Pt denies fevers, chills, c/p, sob, n/v/d, abd pain, dysuria, frequency, hematuria, numbness, weakness or tingling to the lower ext  Prior to Admission Medications   Prescriptions Last Dose Informant Patient Reported? Taking?   acyclovir (ZOVIRAX) 800 mg tablet   No No   Sig: Take 1 tablet by mouth 5 (five) times a day for 7 days   methocarbamol (ROBAXIN) 500 mg tablet   No No   Sig: Take 1 tablet by mouth 4 (four) times a day as needed for muscle spasms for up to 10 days   naproxen (NAPROSYN) 500 mg tablet   No No   Sig: Take 1 tablet by mouth 2 (two) times a day with meals      Facility-Administered Medications: None       Past Medical History:   Diagnosis Date    Fibromyalgia     Meningitis        Past Surgical History:   Procedure Laterality Date    HAND FRACTURE REPAIR         History reviewed  No pertinent family history  I have reviewed and agree with the history as documented  Social History     Tobacco Use    Smoking status: Current Every Day Smoker     Packs/day: 0 25   Substance Use Topics    Alcohol use: No    Drug use: No        Review of Systems   Constitutional: Negative for chills, diaphoresis, fatigue and fever  HENT: Negative for congestion, ear pain, rhinorrhea, sneezing and sore throat  Respiratory: Negative for cough, shortness of breath, wheezing and stridor      Cardiovascular: Negative for chest pain, palpitations and leg swelling  Gastrointestinal: Negative for abdominal distention, abdominal pain, blood in stool, constipation, diarrhea, nausea and vomiting  Genitourinary: Negative for difficulty urinating, dysuria, frequency, hematuria and urgency  Musculoskeletal: Positive for back pain  Negative for arthralgias, gait problem, joint swelling, myalgias, neck pain and neck stiffness  Skin: Negative for color change, pallor, rash and wound  Neurological: Negative for dizziness, syncope, weakness, light-headedness and headaches  All other systems reviewed and are negative  Physical Exam  Physical Exam   Constitutional: She is oriented to person, place, and time  She appears well-developed and well-nourished  No distress  HENT:   Head: Normocephalic and atraumatic  Cardiovascular: Normal rate, regular rhythm, normal heart sounds and intact distal pulses  Exam reveals no gallop and no friction rub  No murmur heard  Pulmonary/Chest: Effort normal and breath sounds normal  No respiratory distress  She has no wheezes  She has no rales  She exhibits no tenderness  Abdominal: Soft  Bowel sounds are normal  She exhibits no distension and no mass  There is no tenderness  There is no rebound and no guarding  No hernia  Musculoskeletal: Normal range of motion  She exhibits no edema, tenderness or deformity  Lymphadenopathy:     She has no cervical adenopathy  Neurological: She is alert and oriented to person, place, and time  Reflex Scores:       Patellar reflexes are 2+ on the right side and 2+ on the left side  Achilles reflexes are 2+ on the right side and 2+ on the left side  Strength 5/5 bilateral lower ext    + straight leg raise R leg at 45 ° negative left     Skin: Skin is warm and dry  No rash noted  She is not diaphoretic  No erythema  No pallor  Psychiatric: She has a normal mood and affect  Her behavior is normal    Nursing note and vitals reviewed        Vital Signs  ED Triage Vitals Temperature Pulse Respirations Blood Pressure SpO2   06/25/19 1522 06/25/19 1519 06/25/19 1519 06/25/19 1522 06/25/19 1519   98 3 °F (36 8 °C) 98 18 154/97 98 %      Temp src Heart Rate Source Patient Position - Orthostatic VS BP Location FiO2 (%)   -- -- -- -- --             Pain Score       06/25/19 1519       9           Vitals:    06/25/19 1519 06/25/19 1522   BP:  154/97   Pulse: 98          Visual Acuity      ED Medications  Medications - No data to display    Diagnostic Studies  Results Reviewed     Procedure Component Value Units Date/Time    POCT pregnancy, urine [[id number]]  (Normal) Resulted:  06/25/19 1533    Lab Status:  Final result Updated:  06/25/19 1533     EXT PREG TEST UR (Ref: Negative) negative     Control valid                 No orders to display              Procedures  Procedures       ED Course                               MDM    Disposition  Final diagnoses:   Acute bilateral low back pain with bilateral sciatica   Lumbar radiculopathy     Time reflects when diagnosis was documented in both MDM as applicable and the Disposition within this note     Time User Action Codes Description Comment    6/25/2019  3:48 PM Mariely Stacy,  M54 41] Acute bilateral low back pain with bilateral sciatica     6/25/2019  3:48 PM Kathrine Miranda [M54 16] Lumbar radiculopathy       ED Disposition     ED Disposition Condition Date/Time Comment    Discharge Stable Tue Jun 25, 2019  3:48 PM [place] discharge to home/self care              Follow-up Information     Follow up With Specialties Details Why Contact Info    Bard Remedios MD Internal Medicine   [address] [place] [address]  [phone]            Discharge Medication List as of 6/25/2019  3:50 PM      CONTINUE these medications which have NOT CHANGED    Details   acyclovir (ZOVIRAX) 800 mg tablet Take 1 tablet by mouth 5 (five) times a day for 7 days, Starting Wed 8/16/2017, Until Wed 8/23/2017, Print methocarbamol (ROBAXIN) 500 mg tablet Take 1 tablet by mouth 4 (four) times a day as needed for muscle spasms for up to 10 days, Starting Thu 8/3/2017, Until Sun 8/13/2017, Print      naproxen (NAPROSYN) 500 mg tablet Take 1 tablet by mouth 2 (two) times a day with meals, Starting Thu 8/3/2017, Until Fri 8/3/2018, Print           No discharge procedures on file      ED Provider  Electronically Signed by           Daphine Cooks, PA-C  06/25/19 4105

## 2023-02-22 ENCOUNTER — OFFICE VISIT (OUTPATIENT)
Dept: URGENT CARE | Facility: CLINIC | Age: 33
End: 2023-02-22

## 2023-02-22 VITALS
TEMPERATURE: 97.6 F | HEART RATE: 88 BPM | OXYGEN SATURATION: 99 % | WEIGHT: 150 LBS | BODY MASS INDEX: 24.96 KG/M2 | RESPIRATION RATE: 18 BRPM

## 2023-02-22 DIAGNOSIS — R51.9 ACUTE NONINTRACTABLE HEADACHE, UNSPECIFIED HEADACHE TYPE: ICD-10-CM

## 2023-02-22 DIAGNOSIS — K04.7 DENTAL ABSCESS: Primary | ICD-10-CM

## 2023-02-22 RX ORDER — AMOXICILLIN AND CLAVULANATE POTASSIUM 875; 125 MG/1; MG/1
1 TABLET, FILM COATED ORAL EVERY 12 HOURS SCHEDULED
Qty: 14 TABLET | Refills: 0 | Status: SHIPPED | OUTPATIENT
Start: 2023-02-22 | End: 2023-03-01

## 2023-02-22 RX ORDER — ACETAMINOPHEN 325 MG/1
650 TABLET ORAL ONCE
Status: COMPLETED | OUTPATIENT
Start: 2023-02-22 | End: 2023-02-22

## 2023-02-22 RX ADMIN — ACETAMINOPHEN 650 MG: 325 TABLET ORAL at 10:27

## 2023-02-22 NOTE — PROGRESS NOTES
[facility]        NAME: Sudeep Arellano is a 28 y o  female  : 1990    MRN: [id number]  DATE: 2023  TIME: 10:40 AM    Assessment and Plan   Dental abscess [K04 7]  1  Dental abscess  amoxicillin-clavulanate (AUGMENTIN) 875-125 mg per tablet    al mag oxide-diphenhydramine-lidocaine viscous (MAGIC MOUTHWASH) 1:1:1 suspension      2  Acute nonintractable headache, unspecified headache type  acetaminophen (TYLENOL) tablet 650 mg        Augmentin BID x 7 days  Tylenol given here for headache  Follow up with dentist/oral surgeon     Patient Instructions       Follow up with PCP in 3-5 days  Proceed to  ER if symptoms worsen  Chief Complaint     Chief Complaint   Patient presents with   • Dental Pain     Left lower tooth abscess since last week  Requests something for headache  • Headache         History of Present Illness       Patient is a 29 yo female who presents for evaluation of pain and swelling associated with left lower molar x 304 days  Patient has history of dental work on this tooth and states the tooth broke in [place] but she has not had time to see a dentist  The pain is giving her a headache  She denies fevers, dysphagia, drooling, SOB  Review of Systems   Review of Systems   Constitutional: Negative for fever  HENT: Positive for dental problem  Negative for drooling, trouble swallowing and voice change  Respiratory: Negative for shortness of breath  Neurological: Positive for headaches           Current Medications       Current Outpatient Medications:   •  al mag oxide-diphenhydramine-lidocaine viscous (MAGIC MOUTHWASH) 1:1:1 suspension, Swish and spit 10 mL every 4 (four) hours as needed for mouth pain or discomfort, Disp: 90 mL, Rfl: 0  •  amoxicillin-clavulanate (AUGMENTIN) 875-125 mg per tablet, Take 1 tablet by mouth every 12 (twelve) hours for 7 days, Disp: 14 tablet, Rfl: 0  •  acyclovir (ZOVIRAX) 800 mg tablet, Take 1 tablet by mouth 5 (five) times a day for 7 days, Disp: 35 tablet, Rfl: 0  •  lidocaine (LIDODERM) 5 %, Apply 1 patch topically 2 (two) times a day Remove & Discard patch within 12 hours or as directed by MD, Disp: 30 patch, Rfl: 0  •  methocarbamol (ROBAXIN) 500 mg tablet, Take 1 tablet by mouth 4 (four) times a day as needed for muscle spasms for up to 10 days, Disp: 20 tablet, Rfl: 0  •  naproxen (NAPROSYN) 500 mg tablet, Take 1 tablet by mouth 2 (two) times a day with meals, Disp: 20 tablet, Rfl: 0  No current facility-administered medications for this visit  Current Allergies     Allergies as of 02/22/2023 - Reviewed 02/22/2023   Allergen Reaction Noted   • Bactrim [sulfamethoxazole-trimethoprim] Other (See Comments) 08/03/2017            The following portions of the patient's history were reviewed and updated as appropriate: allergies, current medications, past family history, past medical history, past social history, past surgical history and problem list      Past Medical History:   Diagnosis Date   • Fibromyalgia    • Meningitis        Past Surgical History:   Procedure Laterality Date   • HAND FRACTURE REPAIR         History reviewed  No pertinent family history  Medications have been verified  Objective   Pulse 88   Temp 97 6 °F (36 4 °C)   Resp 18   Wt 68 kg (150 lb)   SpO2 99%   BMI 24 96 kg/m²        Physical Exam     Physical Exam  Constitutional:       General: She is not in acute distress  Appearance: She is not toxic-appearing  HENT:      Mouth/Throat:      Comments: Abscess of left lower posterior molar  Airway clear   Cardiovascular:      Rate and Rhythm: Normal rate  Pulmonary:      Effort: Pulmonary effort is normal    Skin:     General: Skin is warm and dry  Neurological:      Mental Status: She is alert     Psychiatric:         Mood and Affect: Mood normal          Behavior: Behavior normal

## 2023-06-09 ENCOUNTER — OFFICE VISIT (OUTPATIENT)
Dept: URGENT CARE | Facility: CLINIC | Age: 33
End: 2023-06-09
Payer: COMMERCIAL

## 2023-06-09 VITALS
SYSTOLIC BLOOD PRESSURE: 124 MMHG | TEMPERATURE: 97.3 F | RESPIRATION RATE: 18 BRPM | DIASTOLIC BLOOD PRESSURE: 91 MMHG | OXYGEN SATURATION: 98 % | HEART RATE: 84 BPM

## 2023-06-09 DIAGNOSIS — J01.00 ACUTE NON-RECURRENT MAXILLARY SINUSITIS: Primary | ICD-10-CM

## 2023-06-09 PROCEDURE — 99214 OFFICE O/P EST MOD 30 MIN: CPT | Performed by: FAMILY MEDICINE

## 2023-06-09 RX ORDER — FLUTICASONE PROPIONATE 50 MCG
1 SPRAY, SUSPENSION (ML) NASAL DAILY
Qty: 11.1 ML | Refills: 0 | Status: SHIPPED | OUTPATIENT
Start: 2023-06-09

## 2023-06-09 RX ORDER — DOXYCYCLINE 100 MG/1
100 TABLET ORAL 2 TIMES DAILY
Qty: 14 TABLET | Refills: 0 | Status: SHIPPED | OUTPATIENT
Start: 2023-06-09 | End: 2023-06-16

## 2023-06-09 RX ORDER — FLUTICASONE PROPIONATE 50 MCG
2 SPRAY, SUSPENSION (ML) NASAL DAILY
COMMUNITY
End: 2023-06-09

## 2023-06-09 NOTE — PROGRESS NOTES
[facility]        NAME: Stephen Oneal is a 28 y o  female  : 1990    MRN: [id number]  DATE: 2023  TIME: 3:51 PM    Assessment and Plan   Acute non-recurrent maxillary sinusitis [J01 00]  1  Acute non-recurrent maxillary sinusitis  doxycycline (ADOXA) 100 MG tablet    fluticasone (FLONASE) 50 mcg/act nasal spray            Patient Instructions     Antibiotics given for sinusitis  Recommend OTC decongestants  Follow up with PCP in 3-5 days if no improvement  Proceed to ER if symptoms worsen  Chief Complaint     Chief Complaint   Patient presents with   • Sinusitis     Patient reported that she's been having sinus infection symptoms for two weeks  Patient reported that she's been having a lot of pressure in both her ears, along with vertigo  Patient reported sinus/facial pressure, and headaches  Patient reported post-nasal drip, runny nose but no discharge out of ears, with a PMH of ear tubes  Patient reported she's been treating at home with Tylenol, Claritin, and Nettipot OTC         History of Present Illness     Stephen Oneal is a 28 y o  female presenting to the office today for facial pain and congestion  Symptoms have been present for 14 days, and include congestion and pressure  She has tried tylenol and claritin for her symptoms, with no relief  Review of Systems     Review of Systems   Constitutional: Negative for chills, fatigue and fever  HENT: Positive for congestion, sinus pressure and sinus pain  Negative for ear discharge, ear pain, postnasal drip and sore throat  Eyes: Negative for pain and discharge  Respiratory: Negative for cough and shortness of breath  Cardiovascular: Negative for chest pain and palpitations  Gastrointestinal: Negative for abdominal pain, diarrhea, nausea and vomiting  Genitourinary: Negative for difficulty urinating and dysuria  Musculoskeletal: Negative for arthralgias and myalgias  Skin: Negative for rash     Neurological: Negative for dizziness, syncope, light-headedness, numbness and headaches  Psychiatric/Behavioral: Negative for agitation  All other systems reviewed and are negative  Current Medications       Current Outpatient Medications:   •  doxycycline (ADOXA) 100 MG tablet, Take 1 tablet (100 mg total) by mouth 2 (two) times a day for 7 days, Disp: 14 tablet, Rfl: 0  •  fluticasone (FLONASE) 50 mcg/act nasal spray, 1 spray into each nostril daily, Disp: 11 1 mL, Rfl: 0    Current Allergies     Allergies as of 06/09/2023 - Reviewed 06/09/2023   Allergen Reaction Noted   • Bactrim [sulfamethoxazole-trimethoprim] Other (See Comments) 08/03/2017   • Sulfa antibiotics Other (See Comments) 06/14/2021            The following portions of the patient's history were reviewed and updated as appropriate: allergies, current medications, past family history, past medical history, past social history, past surgical history and problem list      Past Medical History:   Diagnosis Date   • Fibromyalgia    • IBS (irritable bowel syndrome)     IBS-D   • Meningitis    • Seasonal allergies    • Shingles     x2; 16 and 26       Past Surgical History:   Procedure Laterality Date   • HAND FRACTURE REPAIR         History reviewed  No pertinent family history  Medications have been verified  Objective     /91   Pulse 84   Temp (!) 97 3 °F (36 3 °C) (Temporal)   Resp 18   SpO2 98%   No LMP recorded  Physical Exam     Physical Exam  Vitals reviewed  Constitutional:       General: She is not in acute distress  Appearance: Normal appearance  She is not ill-appearing  HENT:      Head: Normocephalic and atraumatic  Right Ear: Ear canal normal  A middle ear effusion is present  Left Ear: Ear canal normal  A middle ear effusion is present  Nose:      Right Sinus: Maxillary sinus tenderness present  No frontal sinus tenderness  Left Sinus: Maxillary sinus tenderness present  No frontal sinus tenderness  Mouth/Throat:      Mouth: Mucous membranes are moist       Pharynx: Posterior oropharyngeal erythema present  No oropharyngeal exudate  Tonsils: No tonsillar exudate  Eyes:      Extraocular Movements: Extraocular movements intact  Conjunctiva/sclera: Conjunctivae normal       Pupils: Pupils are equal, round, and reactive to light  Cardiovascular:      Rate and Rhythm: Normal rate and regular rhythm  Pulses: Normal pulses  Heart sounds: Normal heart sounds  No murmur heard  Pulmonary:      Effort: Pulmonary effort is normal  No respiratory distress  Breath sounds: Normal breath sounds  No wheezing  Musculoskeletal:      Cervical back: Normal range of motion and neck supple  No tenderness  Skin:     General: Skin is warm  Neurological:      General: No focal deficit present  Mental Status: She is alert     Psychiatric:         Mood and Affect: Mood normal          Behavior: Behavior normal          Judgment: Judgment normal

## 2023-06-09 NOTE — LETTER
To whom it may concern,      Jerad Alvarenga was seen in my office on 06/09/23  She may return to work tomorrow  Thank you!       Sincerely,    Alissa Reis, DO

## 2023-07-01 ENCOUNTER — HOSPITAL ENCOUNTER (EMERGENCY)
Facility: HOSPITAL | Age: 33
Discharge: HOME | End: 2023-07-01
Attending: EMERGENCY MEDICINE
Payer: COMMERCIAL

## 2023-07-01 ENCOUNTER — APPOINTMENT (EMERGENCY)
Dept: CT IMAGING | Facility: HOSPITAL | Age: 33
End: 2023-07-01
Payer: COMMERCIAL

## 2023-07-01 VITALS
OXYGEN SATURATION: 98 % | TEMPERATURE: 98.3 F | HEART RATE: 65 BPM | SYSTOLIC BLOOD PRESSURE: 122 MMHG | RESPIRATION RATE: 18 BRPM | DIASTOLIC BLOOD PRESSURE: 77 MMHG

## 2023-07-01 DIAGNOSIS — R11.2 NAUSEA AND VOMITING, UNSPECIFIED VOMITING TYPE: ICD-10-CM

## 2023-07-01 DIAGNOSIS — R10.10 PAIN OF UPPER ABDOMEN: Primary | ICD-10-CM

## 2023-07-01 LAB
ALBUMIN SERPL BCP-MCNC: 4 G/DL (ref 3.5–5)
ALP SERPL-CCNC: 35 U/L (ref 34–104)
ALT SERPL W P-5'-P-CCNC: 21 U/L (ref 7–52)
ANION GAP SERPL CALCULATED.3IONS-SCNC: 8 MMOL/L
AST SERPL W P-5'-P-CCNC: 15 U/L (ref 13–39)
BASOPHILS # BLD AUTO: 0.04 THOUSANDS/ÂΜL (ref 0–0.1)
BASOPHILS NFR BLD AUTO: 0 % (ref 0–1)
BILIRUB SERPL-MCNC: 0.57 MG/DL (ref 0.2–1)
BILIRUB UR QL STRIP: NEGATIVE
BUN SERPL-MCNC: 10 MG/DL (ref 5–25)
CALCIUM SERPL-MCNC: 9 MG/DL (ref 8.4–10.2)
CHLORIDE SERPL-SCNC: 108 MMOL/L (ref 96–108)
CLARITY UR: CLEAR
CO2 SERPL-SCNC: 21 MMOL/L (ref 21–32)
COLOR UR: COLORLESS
CREAT SERPL-MCNC: 0.61 MG/DL (ref 0.6–1.3)
EOSINOPHIL # BLD AUTO: 0.01 THOUSAND/ÂΜL (ref 0–0.61)
EOSINOPHIL NFR BLD AUTO: 0 % (ref 0–6)
ERYTHROCYTE [DISTWIDTH] IN BLOOD BY AUTOMATED COUNT: 11.2 % (ref 11.6–15.1)
EXT PREGNANCY TEST URINE: NEGATIVE
EXT. CONTROL: NORMAL
GFR SERPL CREATININE-BSD FRML MDRD: 119 ML/MIN/1.73SQ M
GLUCOSE SERPL-MCNC: 103 MG/DL (ref 65–140)
GLUCOSE UR STRIP-MCNC: NEGATIVE MG/DL
HCT VFR BLD AUTO: 38.3 % (ref 34.8–46.1)
HGB BLD-MCNC: 13.4 G/DL (ref 11.5–15.4)
HGB UR QL STRIP.AUTO: NEGATIVE
IMM GRANULOCYTES # BLD AUTO: 0.02 THOUSAND/UL (ref 0–0.2)
IMM GRANULOCYTES NFR BLD AUTO: 0 % (ref 0–2)
KETONES UR STRIP-MCNC: NEGATIVE MG/DL
LEUKOCYTE ESTERASE UR QL STRIP: NEGATIVE
LIPASE SERPL-CCNC: 24 U/L (ref 11–82)
LYMPHOCYTES # BLD AUTO: 2.88 THOUSANDS/ÂΜL (ref 0.6–4.47)
LYMPHOCYTES NFR BLD AUTO: 27 % (ref 14–44)
MCH RBC QN AUTO: 30.5 PG (ref 26.8–34.3)
MCHC RBC AUTO-ENTMCNC: 35 G/DL (ref 31.4–37.4)
MCV RBC AUTO: 87 FL (ref 82–98)
MONOCYTES # BLD AUTO: 0.58 THOUSAND/ÂΜL (ref 0.17–1.22)
MONOCYTES NFR BLD AUTO: 5 % (ref 4–12)
NEUTROPHILS # BLD AUTO: 7.16 THOUSANDS/ÂΜL (ref 1.85–7.62)
NEUTS SEG NFR BLD AUTO: 68 % (ref 43–75)
NITRITE UR QL STRIP: NEGATIVE
NRBC BLD AUTO-RTO: 0 /100 WBCS
PH UR STRIP.AUTO: 8 [PH]
PLATELET # BLD AUTO: 225 THOUSANDS/UL (ref 149–390)
PMV BLD AUTO: 8.2 FL (ref 8.9–12.7)
POTASSIUM SERPL-SCNC: 3.7 MMOL/L (ref 3.5–5.3)
PROT SERPL-MCNC: 6.4 G/DL (ref 6.4–8.4)
PROT UR STRIP-MCNC: NEGATIVE MG/DL
RBC # BLD AUTO: 4.4 MILLION/UL (ref 3.81–5.12)
SODIUM SERPL-SCNC: 137 MMOL/L (ref 135–147)
SP GR UR STRIP.AUTO: 1.01 (ref 1–1.03)
UROBILINOGEN UR STRIP-ACNC: <2 MG/DL
WBC # BLD AUTO: 10.69 THOUSAND/UL (ref 4.31–10.16)

## 2023-07-01 PROCEDURE — 99284 EMERGENCY DEPT VISIT MOD MDM: CPT

## 2023-07-01 PROCEDURE — G1004 CDSM NDSC: HCPCS

## 2023-07-01 PROCEDURE — 96374 THER/PROPH/DIAG INJ IV PUSH: CPT

## 2023-07-01 PROCEDURE — 96361 HYDRATE IV INFUSION ADD-ON: CPT

## 2023-07-01 PROCEDURE — 36415 COLL VENOUS BLD VENIPUNCTURE: CPT

## 2023-07-01 PROCEDURE — 85025 COMPLETE CBC W/AUTO DIFF WBC: CPT | Performed by: EMERGENCY MEDICINE

## 2023-07-01 PROCEDURE — 83690 ASSAY OF LIPASE: CPT | Performed by: EMERGENCY MEDICINE

## 2023-07-01 PROCEDURE — 96376 TX/PRO/DX INJ SAME DRUG ADON: CPT

## 2023-07-01 PROCEDURE — 96375 TX/PRO/DX INJ NEW DRUG ADDON: CPT

## 2023-07-01 PROCEDURE — 74177 CT ABD & PELVIS W/CONTRAST: CPT

## 2023-07-01 PROCEDURE — C9113 INJ PANTOPRAZOLE SODIUM, VIA: HCPCS | Performed by: EMERGENCY MEDICINE

## 2023-07-01 PROCEDURE — 80053 COMPREHEN METABOLIC PANEL: CPT | Performed by: EMERGENCY MEDICINE

## 2023-07-01 PROCEDURE — 81025 URINE PREGNANCY TEST: CPT | Performed by: EMERGENCY MEDICINE

## 2023-07-01 PROCEDURE — 81003 URINALYSIS AUTO W/O SCOPE: CPT | Performed by: EMERGENCY MEDICINE

## 2023-07-01 RX ORDER — ONDANSETRON 2 MG/ML
4 INJECTION INTRAMUSCULAR; INTRAVENOUS ONCE
Status: COMPLETED | OUTPATIENT
Start: 2023-07-01 | End: 2023-07-01

## 2023-07-01 RX ORDER — ONDANSETRON 4 MG/1
4 TABLET, ORALLY DISINTEGRATING ORAL EVERY 8 HOURS PRN
Qty: 12 TABLET | Refills: 0 | Status: SHIPPED | OUTPATIENT
Start: 2023-07-01 | End: 2023-07-31

## 2023-07-01 RX ORDER — PANTOPRAZOLE SODIUM 40 MG/10ML
40 INJECTION, POWDER, LYOPHILIZED, FOR SOLUTION INTRAVENOUS ONCE
Status: COMPLETED | OUTPATIENT
Start: 2023-07-01 | End: 2023-07-01

## 2023-07-01 RX ORDER — DICYCLOMINE HCL 20 MG
20 TABLET ORAL EVERY 6 HOURS PRN
Qty: 50 TABLET | Refills: 0 | Status: SHIPPED | OUTPATIENT
Start: 2023-07-01

## 2023-07-01 RX ORDER — DICYCLOMINE HCL 20 MG
20 TABLET ORAL ONCE
Status: COMPLETED | OUTPATIENT
Start: 2023-07-01 | End: 2023-07-01

## 2023-07-01 RX ORDER — MORPHINE SULFATE 4 MG/ML
4 INJECTION, SOLUTION INTRAMUSCULAR; INTRAVENOUS ONCE
Status: COMPLETED | OUTPATIENT
Start: 2023-07-01 | End: 2023-07-01

## 2023-07-01 RX ORDER — KETOROLAC TROMETHAMINE 30 MG/ML
15 INJECTION, SOLUTION INTRAMUSCULAR; INTRAVENOUS ONCE
Status: COMPLETED | OUTPATIENT
Start: 2023-07-01 | End: 2023-07-01

## 2023-07-01 RX ADMIN — KETOROLAC TROMETHAMINE 15 MG: 30 INJECTION, SOLUTION INTRAMUSCULAR at 03:59

## 2023-07-01 RX ADMIN — DICYCLOMINE HYDROCHLORIDE 20 MG: 20 TABLET ORAL at 08:40

## 2023-07-01 RX ADMIN — SODIUM CHLORIDE 1000 ML: 0.9 INJECTION, SOLUTION INTRAVENOUS at 03:45

## 2023-07-01 RX ADMIN — IOHEXOL 100 ML: 350 INJECTION, SOLUTION INTRAVENOUS at 04:51

## 2023-07-01 RX ADMIN — ONDANSETRON 4 MG: 2 INJECTION INTRAMUSCULAR; INTRAVENOUS at 08:36

## 2023-07-01 RX ADMIN — ONDANSETRON 4 MG: 2 INJECTION INTRAMUSCULAR; INTRAVENOUS at 04:57

## 2023-07-01 RX ADMIN — MORPHINE SULFATE 4 MG: 4 INJECTION INTRAVENOUS at 04:58

## 2023-07-01 RX ADMIN — PANTOPRAZOLE SODIUM 40 MG: 40 INJECTION, POWDER, FOR SOLUTION INTRAVENOUS at 08:36

## 2023-07-01 RX ADMIN — ONDANSETRON 4 MG: 2 INJECTION INTRAMUSCULAR; INTRAVENOUS at 03:52

## 2023-07-01 NOTE — ED PROVIDER NOTES
History  Chief Complaint   Patient presents with   • Abdominal Pain     Pt arrives via EMS with a c/o LLQ and LRQ ABD pain since this morning but worsening tonight as well as N/V. History provided by:  Patient  Abdominal Pain  Pain location:  RUQ and periumbilical  Pain quality: aching    Pain radiates to:  Does not radiate  Pain severity:  Moderate  Onset quality:  Gradual  Duration:  2 days  Timing:  Constant  Progression:  Waxing and waning  Chronicity:  New  Context: not previous surgeries    Relieved by:  Nothing  Worsened by:  Nothing  Ineffective treatments:  None tried  Associated symptoms: chills, fatigue, nausea and vomiting    Associated symptoms: no chest pain        Prior to Admission Medications   Prescriptions Last Dose Informant Patient Reported? Taking?   fluticasone (FLONASE) 50 mcg/act nasal spray   No No   Si spray into each nostril daily      Facility-Administered Medications: None       Past Medical History:   Diagnosis Date   • Fibromyalgia    • IBS (irritable bowel syndrome)     IBS-D   • Meningitis    • Seasonal allergies    • Shingles     x2; 16 and 26       Past Surgical History:   Procedure Laterality Date   • HAND FRACTURE REPAIR         History reviewed. No pertinent family history. I have reviewed and agree with the history as documented. E-Cigarette/Vaping   • E-Cigarette Use Never User      E-Cigarette/Vaping Substances   • Nicotine No    • THC No    • CBD No    • Flavoring No    • Other No    • Unknown No      Social History     Tobacco Use   • Smoking status: Every Day     Packs/day: 0.25     Types: Cigarettes   Vaping Use   • Vaping Use: Never used   Substance Use Topics   • Alcohol use: No   • Drug use: No       Review of Systems   Constitutional: Positive for chills and fatigue. Cardiovascular: Negative for chest pain. Gastrointestinal: Positive for abdominal pain, nausea and vomiting. All other systems reviewed and are negative.       Physical Exam  Physical Exam  Vitals and nursing note reviewed. Constitutional:       General: She is not in acute distress. Appearance: She is well-developed. She is not diaphoretic. HENT:      Head: Normocephalic and atraumatic. Nose: Nose normal.      Mouth/Throat:      Mouth: Mucous membranes are moist.   Eyes:      Conjunctiva/sclera: Conjunctivae normal.   Cardiovascular:      Rate and Rhythm: Normal rate and regular rhythm. Heart sounds: Normal heart sounds. Pulmonary:      Effort: Pulmonary effort is normal. No respiratory distress. Breath sounds: Normal breath sounds. Abdominal:      Palpations: Abdomen is soft. Tenderness: There is abdominal tenderness in the right upper quadrant, epigastric area, left upper quadrant and left lower quadrant. Musculoskeletal:         General: Normal range of motion. Cervical back: Normal range of motion and neck supple. Skin:     General: Skin is warm and dry. Neurological:      General: No focal deficit present. Mental Status: She is alert and oriented to person, place, and time. Mental status is at baseline. Cranial Nerves: No cranial nerve deficit.    Psychiatric:         Mood and Affect: Mood normal.         Vital Signs  ED Triage Vitals   Temperature Pulse Respirations Blood Pressure SpO2   07/01/23 0340 07/01/23 0340 07/01/23 0340 07/01/23 0340 07/01/23 0340   98.3 °F (36.8 °C) 74 18 133/86 98 %      Temp Source Heart Rate Source Patient Position - Orthostatic VS BP Location FiO2 (%)   07/01/23 0340 07/01/23 0340 07/01/23 0340 07/01/23 0340 --   Oral Monitor Lying Right arm       Pain Score       07/01/23 0359       10 - Worst Possible Pain           Vitals:    07/01/23 0340 07/01/23 0400 07/01/23 0500 07/01/23 0600   BP: 133/86 130/76 128/68 124/66   Pulse: 74 65 66 67   Patient Position - Orthostatic VS: Lying Lying Lying Lying         Visual Acuity      ED Medications  Medications   sodium chloride 0.9 % bolus 1,000 mL (0 mL Intravenous Stopped 7/1/23 0459)   ondansetron (ZOFRAN) injection 4 mg (4 mg Intravenous Given 7/1/23 0352)   ketorolac (TORADOL) injection 15 mg (15 mg Intravenous Given 7/1/23 0359)   morphine injection 4 mg (4 mg Intravenous Given 7/1/23 0458)   ondansetron (ZOFRAN) injection 4 mg (4 mg Intravenous Given 7/1/23 0457)   iohexol (OMNIPAQUE) 350 MG/ML injection (MULTI-DOSE) 100 mL (100 mL Intravenous Given 7/1/23 0451)       Diagnostic Studies  Results Reviewed     Procedure Component Value Units Date/Time    Comprehensive metabolic panel [[id number]] Collected: 07/01/23 0343    Lab Status: Final result Specimen: Blood from Arm, Left Updated: 07/01/23 0423     Sodium 137 mmol/L      Potassium 3.7 mmol/L      Chloride 108 mmol/L      CO2 21 mmol/L      ANION GAP 8 mmol/L      BUN 10 mg/dL      Creatinine 0.61 mg/dL      Glucose 103 mg/dL      Calcium 9.0 mg/dL      AST 15 U/L      ALT 21 U/L      Alkaline Phosphatase 35 U/L      Total Protein 6.4 g/dL      Albumin 4.0 g/dL      Total Bilirubin 0.57 mg/dL      eGFR 119 ml/min/1.73sq m     Narrative:      [place] guidelines for Chronic Kidney Disease (CKD):   •  Stage 1 with normal or high GFR (GFR > 90 mL/min/1.73 square meters)  •  Stage 2 Mild CKD (GFR = 60-89 mL/min/1.73 square meters)  •  Stage 3A Moderate CKD (GFR = 45-59 mL/min/1.73 square meters)  •  Stage 3B Moderate CKD (GFR = 30-44 mL/min/1.73 square meters)  •  Stage 4 Severe CKD (GFR = 15-29 mL/min/1.73 square meters)  •  Stage 5 End Stage CKD (GFR <15 mL/min/1.73 square meters)  Note: GFR calculation is accurate only with a steady state creatinine    Lipase [[id number]]  (Normal) Collected: 07/01/23 0343    Lab Status: Final result Specimen: Blood from Arm, Left Updated: 07/01/23 0423     Lipase 24 u/L     UA w Reflex to Microscopic w Reflex to Culture [[id number]] Collected: 07/01/23 0347    Lab Status: Final result Specimen: Urine, Clean Catch Updated: 07/01/23 6215     Color, UA Colorless Clarity, UA Clear     Specific Gravity, UA 1.009     pH, UA 8.0     Leukocytes, UA Negative     Nitrite, UA Negative     Protein, UA Negative mg/dl      Glucose, UA Negative mg/dl      Ketones, UA Negative mg/dl      Urobilinogen, UA <2.0 mg/dl      Bilirubin, UA Negative     Occult Blood, UA Negative    CBC and differential [[id number]]  (Abnormal) Collected: 07/01/23 0343    Lab Status: Final result Specimen: Blood from Arm, Left Updated: 07/01/23 0350     WBC 10.69 Thousand/uL      RBC 4.40 Million/uL      Hemoglobin 13.4 g/dL      Hematocrit 38.3 %      MCV 87 fL      MCH 30.5 pg      MCHC 35.0 g/dL      RDW 11.2 %      MPV 8.2 fL      Platelets 277 Thousands/uL      nRBC 0 /100 WBCs      Neutrophils Relative 68 %      Immat GRANS % 0 %      Lymphocytes Relative 27 %      Monocytes Relative 5 %      Eosinophils Relative 0 %      Basophils Relative 0 %      Neutrophils Absolute 7.16 Thousands/µL      Immature Grans Absolute 0.02 Thousand/uL      Lymphocytes Absolute 2.88 Thousands/µL      Monocytes Absolute 0.58 Thousand/µL      Eosinophils Absolute 0.01 Thousand/µL      Basophils Absolute 0.04 Thousands/µL     POCT pregnancy, urine [[id number]]  (Normal) Resulted: 07/01/23 0347    Lab Status: Final result Specimen: Urine Updated: 07/01/23 0348     EXT Preg Test, Ur Negative     Control Valid                 CT abdomen pelvis with contrast    (Results Pending)              Procedures  Procedures         ED Course                               SBIRT 20yo+    Flowsheet Row Most Recent Value   Initial Alcohol Screen: US AUDIT-C     1. How often do you have a drink containing alcohol? 0 Filed at: 07/01/2023 0341   2. How many drinks containing alcohol do you have on a typical day you are drinking? 0 Filed at: 07/01/2023 0341   3b. FEMALE Any Age, or MALE 65+: How often do you have 4 or more drinks on one occassion?  0 Filed at: 07/01/2023 0341   Audit-C Score 0 Filed at: 07/01/2023 9919   ADRIANA: How many times in the past year have you. .. Used an illegal drug or used a prescription medication for non-medical reasons? Never Filed at: 07/01/2023 0341                    Medical Decision Making  35 female presenting with upper abdominal pain that radiates throughout her entire abdomen with acute nausea and vomiting did have some diarrhea with it earlier but now more continued dry heaving. Feels different from an enteritis feels like she is having more labor contraction type cramping pains with dry heaving. No associated fever. Will evaluate with labs. We will get pregnancy test to rule out pregnancy. We will get CBC for white count and hemoglobin. We will get CMP for electrolytes and creatinine and lipase for ruling out pancreatitis get LFTs for biliary disease. Given more upper abdominal pain and significance we will get CT scan of abdomen to rule out surgical pathology. We will treat symptoms, give fluids for dehydration and medicines for nausea and pain and will reassess. If has something surgical will need admission or if she remains symptomatic. If improved patient will be discharged. Patient was signed out at 7 AM to oncoming attending pending CT scan. Nausea and vomiting, unspecified vomiting type: acute illness or injury  Pain of upper abdomen: acute illness or injury  Amount and/or Complexity of Data Reviewed  Labs: ordered. Radiology: ordered. ECG/medicine tests: ordered and independent interpretation performed. Risk  Prescription drug management. Disposition  Final diagnoses:   None     ED Disposition     None      Follow-up Information    None         Patient's Medications   Discharge Prescriptions    No medications on file       No discharge procedures on file.     PDMP Review     None          ED Provider  Electronically Signed by           Christa Sloan MD  07/07/23 6861

## 2023-07-01 NOTE — DISCHARGE INSTRUCTIONS
Drink plenty of fluids, and eat as you are able to tolerate. Try ibuprofen (Motrin, Advil) or acetaminophen (Tylenol) as needed for pain, as per the instructions, as well as the prescribed Bentyl. Follow-up with your primary care doctor to make sure you are doing better. Return if you are unable to tolerate fluids despite the nausea medication, or for any other concerns.

## 2023-07-01 NOTE — ED PROVIDER NOTES
Care of patient assumed from Dr. Luis M Denis. For full details, please see her note. Briefly, this is a 27-year-old female who presents here today with abdominal pain, nausea and vomiting. She was complaining more of lower abdominal pain but had diffuse tenderness on exam.  She has minimally elevated white count of 10.69, labs are otherwise unremarkable. CT scan is pending. CT scan shows no acute abnormalities to explain her pain. She did endorse improvement initially with pain medications but was having worsening of pain. She is still having some nausea but is not sure if she is truly nauseous or if it is related to pain. We will give her additional medications, and attempt p.o. challenge. She is still having pain though endorses improvement and says she is more comfortable. She is able to drink fluids without vomiting or dry heaving. I discussed with her findings, continued management at home, follow-up, and indications for return, she expresses understanding with this plan. Diagnoses that have been ruled out:   None   Diagnoses that are still under consideration:   None   Final diagnoses:   Pain of upper abdomen   Nausea and vomiting, unspecified vomiting type       Time reflects when diagnosis was documented in both MDM as applicable and the Disposition within this note     Time User Action Codes Description Comment    7/1/2023  6:49 AM Gian, [address] [R10.10] Pain of upper abdomen     7/1/2023  7:01 AM Gian, [address] [R11.2] Nausea and vomiting, unspecified vomiting type       ED Disposition     ED Disposition   Discharge    Condition   Good    Date/Time   Sat Jul 1, 2023  7:45 AM    Comment   Brandy Hale discharge to home/self care.          Follow-up Information     Follow up With Specialties Details Why Contact Info    Balwinder Skaggs MD Internal Medicine Go to  If symptoms worsen [address] [place] [address] Dr Leticia Singer MD [facility]  07/01/23 8940

## 2023-10-12 ENCOUNTER — OFFICE VISIT (OUTPATIENT)
Dept: URGENT CARE | Facility: CLINIC | Age: 33
End: 2023-10-12
Payer: COMMERCIAL

## 2023-10-12 VITALS
BODY MASS INDEX: 28.26 KG/M2 | OXYGEN SATURATION: 98 % | RESPIRATION RATE: 18 BRPM | DIASTOLIC BLOOD PRESSURE: 71 MMHG | TEMPERATURE: 97.4 F | WEIGHT: 169.8 LBS | SYSTOLIC BLOOD PRESSURE: 128 MMHG | HEART RATE: 77 BPM

## 2023-10-12 DIAGNOSIS — K04.7 DENTAL INFECTION: ICD-10-CM

## 2023-10-12 DIAGNOSIS — J01.00 ACUTE NON-RECURRENT MAXILLARY SINUSITIS: Primary | ICD-10-CM

## 2023-10-12 PROCEDURE — 99213 OFFICE O/P EST LOW 20 MIN: CPT | Performed by: PHYSICIAN ASSISTANT

## 2023-10-12 RX ORDER — AMOXICILLIN AND CLAVULANATE POTASSIUM 875; 125 MG/1; MG/1
1 TABLET, FILM COATED ORAL EVERY 12 HOURS SCHEDULED
Qty: 14 TABLET | Refills: 0 | Status: SHIPPED | OUTPATIENT
Start: 2023-10-12 | End: 2023-10-19

## 2023-10-12 NOTE — PROGRESS NOTES
[facility]        NAME: Amari Horta is a 35 y.o. female  : 1990    MRN: [id number]  DATE: 2023  TIME: 9:38 AM      Assessment and Plan     Acute non-recurrent maxillary sinusitis [J01.00]  1. Acute non-recurrent maxillary sinusitis  amoxicillin-clavulanate (AUGMENTIN) 875-125 mg per tablet      2. Dental infection  amoxicillin-clavulanate (AUGMENTIN) 875-125 mg per tablet            Patient Instructions   There are no Patient Instructions on file for this visit. Follow up with PCP in 3-5 days. Go to ER if symptoms worsen. Chief Complaint     Chief Complaint   Patient presents with    Cold Like Symptoms     Patient reported a couple weeks ago she started developing LT ear pressure and feeling like there is fluid on her ear. Patient reported that she then started developing sinus symptoms shortly after. She was treating with allergy medicine, cold/cough medicine without relief. Patient reported this past Monday she developed pain in her tooth on same side. Patient reported this morning she woke up and it was 10x worse. History of Present Illness     Patient presents with sinus pressure and left ear pressure x 2 weeks. She has tried sudafed and mucinex with some relief. She also states that 3 days ago, her back left bottom tooth started to hurt as well. She states she cracked it a while ago. Review of Systems     Review of Systems   Constitutional:  Negative for chills, fatigue and fever. HENT:  Positive for dental problem, ear pain, sinus pressure and sinus pain. Negative for congestion, postnasal drip, rhinorrhea and sore throat. Eyes:  Negative for pain and visual disturbance. Respiratory:  Negative for cough, chest tightness and shortness of breath. Cardiovascular:  Negative for chest pain and palpitations. Gastrointestinal:  Negative for abdominal pain, diarrhea, nausea and vomiting. Genitourinary:  Negative for dysuria and hematuria. Musculoskeletal:  Negative for arthralgias, back pain and myalgias. Skin:  Negative for rash. Neurological:  Negative for dizziness, seizures, syncope, numbness and headaches. All other systems reviewed and are negative. Current Medications       Current Outpatient Medications:     amoxicillin-clavulanate (AUGMENTIN) 875-125 mg per tablet, Take 1 tablet by mouth every 12 (twelve) hours for 7 days, Disp: 14 tablet, Rfl: 0    fluticasone (FLONASE) 50 mcg/act nasal spray, 1 spray into each nostril daily, Disp: 11.1 mL, Rfl: 0    Current Allergies     Allergies as of 10/12/2023 - Reviewed 10/12/2023   Allergen Reaction Noted    Bactrim [sulfamethoxazole-trimethoprim] Other (See Comments) 08/03/2017    Sulfa antibiotics Other (See Comments) 06/14/2021              The following portions of the patient's history were reviewed and updated as appropriate: allergies, current medications, past family history, past medical history, past social history, past surgical history, and problem list.     Past Medical History:   Diagnosis Date    Fibromyalgia     IBS (irritable bowel syndrome)     IBS-D    Meningitis     Seasonal allergies     Shingles     x2; 16 and 26       Past Surgical History:   Procedure Laterality Date    HAND FRACTURE REPAIR         History reviewed. No pertinent family history. Medications have been verified. Objective     /71   Pulse 77   Temp (!) 97.4 °F (36.3 °C) (Tympanic)   Resp 18   Wt 77 kg (169 lb 12.8 oz)   LMP 10/08/2023 (Approximate)   SpO2 98%   BMI 28.26 kg/m²   Patient's last menstrual period was 10/08/2023 (approximate). Physical Exam     Physical Exam  Vitals and nursing note reviewed. Constitutional:       Appearance: Normal appearance. She is normal weight. She is not ill-appearing. HENT:      Head: Normocephalic and atraumatic.       Right Ear: Tympanic membrane, ear canal and external ear normal.      Left Ear: Tympanic membrane, ear canal and external ear normal.      Nose: Nose normal.      Mouth/Throat:      Mouth: Mucous membranes are moist.      Dentition: Gingival swelling present. Pharynx: Oropharynx is clear. Comments: Mild gingival swelling to the last 2 molars on the left side. No abscess. Cardiovascular:      Rate and Rhythm: Normal rate and regular rhythm. Heart sounds: Normal heart sounds. Pulmonary:      Effort: Pulmonary effort is normal.      Breath sounds: Normal breath sounds. Skin:     General: Skin is warm and dry. Neurological:      General: No focal deficit present. Mental Status: She is alert and oriented to person, place, and time.    Psychiatric:         Mood and Affect: Mood normal.         Behavior: Behavior normal.

## 2024-02-21 PROBLEM — R50.9 FEVER: Status: RESOLVED | Noted: 2017-05-29 | Resolved: 2024-02-21

## 2025-01-23 ENCOUNTER — OFFICE VISIT (OUTPATIENT)
Dept: GASTROENTEROLOGY | Facility: CLINIC | Age: 35
End: 2025-01-23
Payer: COMMERCIAL

## 2025-01-23 VITALS
RESPIRATION RATE: 18 BRPM | TEMPERATURE: 97.5 F | SYSTOLIC BLOOD PRESSURE: 120 MMHG | OXYGEN SATURATION: 98 % | WEIGHT: 201 LBS | DIASTOLIC BLOOD PRESSURE: 78 MMHG | HEIGHT: 65 IN | HEART RATE: 90 BPM | BODY MASS INDEX: 33.49 KG/M2

## 2025-01-23 DIAGNOSIS — G89.29 CHRONIC UPPER ABDOMINAL PAIN: Primary | ICD-10-CM

## 2025-01-23 DIAGNOSIS — K52.9 CHRONIC DIARRHEA: ICD-10-CM

## 2025-01-23 DIAGNOSIS — R10.10 CHRONIC UPPER ABDOMINAL PAIN: Primary | ICD-10-CM

## 2025-01-23 DIAGNOSIS — R11.14 BILIOUS VOMITING WITH NAUSEA: ICD-10-CM

## 2025-01-23 PROCEDURE — 99203 OFFICE O/P NEW LOW 30 MIN: CPT | Performed by: PHYSICIAN ASSISTANT

## 2025-01-23 RX ORDER — DOXYCYCLINE HYCLATE 100 MG
1 TABLET ORAL 2 TIMES DAILY
COMMUNITY
Start: 2024-11-14

## 2025-01-23 RX ORDER — OMEPRAZOLE 40 MG/1
40 CAPSULE, DELAYED RELEASE ORAL DAILY
COMMUNITY
Start: 2024-10-21 | End: 2025-10-21

## 2025-01-23 RX ORDER — AMOXICILLIN 875 MG/1
1 TABLET, COATED ORAL 2 TIMES DAILY
COMMUNITY
Start: 2024-11-24

## 2025-01-23 NOTE — PROGRESS NOTES
Name: Sara Villalta      : 1990      MRN: [id number]  Encounter Provider: Cassie Izquierdo PA-C  Encounter Date: 2025   Encounter department: [facility] [place]  :  Assessment & Plan  Chronic upper abdominal pain  - She reports chronic postprandial epigastric and upper abdominal pain with burning at times associated with nausea/vomiting and CT imaging showed a small hiatal hernia  - EGD in  with another provider, unsure of results  - Will repeat EGD; rule rule out PUD, H. Pylori, celiac disease  - Continue omeprazole daily  - Prior RUQ US in  showed small gallbladder polyps; may consider repeating US v doing HIDA to evaluate for biliary cause if no etiology for her postprandial abdominal pain/N/V if found during EGD  Orders:    EGD; Future    Bilious vomiting with nausea    Orders:    EGD; Future    Chronic diarrhea  - She reports chronic diarrhea for most of her life, nonbloody, and a prior colonoscopy in  with another provider with reported scar tissue, the official report is not available  - We will repeat her colonoscopy with TI intubation to evaluate for IBD  - She may have IBS given she has fred CRP/ESR and celiac panel has been negative  - Would consider rifaximin course for treatment of IBS-D if colonoscopy is normal  Orders:    Colonoscopy; Future    EGD; Future        History of Present Illness   HPI  Sara Villalta is a 34 y.o. female who presents for evaluation of multiple chronic GI issues that she has had since she was a child.  She reports that she is always had episodes of postprandial abdominal pain primarily in her upper abdomen or in her lower chest with nausea and vomiting.  She is also had chronic diarrhea on a daily basis for most of her life.  She does not see any black or bloody bowel movements.  She does not really get lower abdominal pain.  Over the past year she has gained a lot of weight which has been upsetting but she is also  "noticed joint pains in her knees and hips and her wrists as well as just generalized swelling and she is not sure if they are related.  She has had some workup with her PCP including a CT scan and routine blood work including inflammatory markers even like a CRP and ESR, celiac panel without any abnormalities.  She believes she did have an endoscopy and a colonoscopy in either 2016 or 2017 and she was told that there was scar tissue throughout her colon but nothing that explains her symptoms.  She was taking a lot of NSAIDs over the past 6 months and she was having worsening symptoms especially vomiting and so she was put on omeprazole by her PCP and that did help.  She is still taking this every day.  She has not seen rheumatology for arthralgias but does have a family history of rheumatoid arthritis.  History obtained from: patient    Review of Systems  Medical History Reviewed by provider this encounter:  Tobacco  Allergies  Meds  Problems  Med Hx  Surg Hx  Fam Hx     .  Current Outpatient Medications on File Prior to Visit   Medication Sig Dispense Refill    fluticasone (FLONASE) 50 mcg/act nasal spray 1 spray into each nostril daily 11.1 mL 0    omeprazole (PriLOSEC) 40 MG capsule Take 40 mg by mouth daily      amoxicillin (AMOXIL) 875 mg tablet Take 1 tablet by mouth 2 (two) times a day (Patient not taking: Reported on 1/23/2025)      doxycycline hyclate (VIBRA-TABS) 100 mg tablet Take 1 tablet by mouth 2 (two) times a day (Patient not taking: Reported on 1/23/2025)       No current facility-administered medications on file prior to visit.         Objective   /78 (BP Location: Left arm, Patient Position: Sitting, Cuff Size: Standard)   Pulse 90   Temp 97.5 °F (36.4 °C) (Tympanic)   Resp 18   Ht 5' 5\" (1.651 m)   Wt 91.2 kg (201 lb)   SpO2 98%   BMI 33.45 kg/m²      Physical Exam  General- appears well, no distress  CV- RRR, no murmur  Pulm- CTA bilatearlly  Abdomen- Nondistended, soft, BS " present, (+) mild periumbilical TTP

## 2025-01-23 NOTE — LETTER
2025     Justin Brock MD  [address] [place] [address]  [address]  [place] [address]    Patient: Sara Villalta   YOB: 1990   Date of Visit: 2025       Dear Dr. Brock:    Thank you for referring Sara Villalta to me for evaluation. Below are my notes for this consultation.    If you have questions, please do not hesitate to call me. I look forward to following your patient along with you.         Sincerely,        Cassie Izquierdo PA-C        CC: No Recipients    Cassie Izquierdo PA-C  2025  5:07 PM  Signed  Name: Sara Villalta      : 1990      MRN: [id number]  Encounter Provider: Cassie Izquierdo PA-C  Encounter Date: 2025   Encounter department: [facility] [place]  :  Assessment & Plan  Chronic upper abdominal pain  - She reports chronic postprandial epigastric and upper abdominal pain with burning at times associated with nausea/vomiting and CT imaging showed a small hiatal hernia  - EGD in  with another provider, unsure of results  - Will repeat EGD; rule rule out PUD, H. Pylori, celiac disease  - Continue omeprazole daily  - Prior RUQ US in  showed small gallbladder polyps; may consider repeating US v doing HIDA to evaluate for biliary cause if no etiology for her postprandial abdominal pain/N/V if found during EGD  Orders:  •  EGD; Future    Bilious vomiting with nausea    Orders:  •  EGD; Future    Chronic diarrhea  - She reports chronic diarrhea for most of her life, nonbloody, and a prior colonoscopy in  with another provider with reported scar tissue, the official report is not available  - We will repeat her colonoscopy with TI intubation to evaluate for IBD  - She may have IBS given she has fred CRP/ESR and celiac panel has been negative  - Would consider rifaximin course for treatment of IBS-D if colonoscopy is normal  Orders:  •  Colonoscopy; Future  •  EGD; Future        History of Present Illness  HPI  Sara Villalta is  a 34 y.o. female who presents for evaluation of multiple chronic GI issues that she has had since she was a child.  She reports that she is always had episodes of postprandial abdominal pain primarily in her upper abdomen or in her lower chest with nausea and vomiting.  She is also had chronic diarrhea on a daily basis for most of her life.  She does not see any black or bloody bowel movements.  She does not really get lower abdominal pain.  Over the past year she has gained a lot of weight which has been upsetting but she is also noticed joint pains in her knees and hips and her wrists as well as just generalized swelling and she is not sure if they are related.  She has had some workup with her PCP including a CT scan and routine blood work including inflammatory markers even like a CRP and ESR, celiac panel without any abnormalities.  She believes she did have an endoscopy and a colonoscopy in either 2016 or 2017 and she was told that there was scar tissue throughout her colon but nothing that explains her symptoms.  She was taking a lot of NSAIDs over the past 6 months and she was having worsening symptoms especially vomiting and so she was put on omeprazole by her PCP and that did help.  She is still taking this every day.  She has not seen rheumatology for arthralgias but does have a family history of rheumatoid arthritis.  History obtained from: patient    Review of Systems  Medical History Reviewed by provider this encounter:  Tobacco  Allergies  Meds  Problems  Med Hx  Surg Hx  Fam Hx     .  Current Outpatient Medications on File Prior to Visit   Medication Sig Dispense Refill   • fluticasone (FLONASE) 50 mcg/act nasal spray 1 spray into each nostril daily 11.1 mL 0   • omeprazole (PriLOSEC) 40 MG capsule Take 40 mg by mouth daily     • amoxicillin (AMOXIL) 875 mg tablet Take 1 tablet by mouth 2 (two) times a day (Patient not taking: Reported on 1/23/2025)     • doxycycline hyclate (VIBRA-TABS) 100  "mg tablet Take 1 tablet by mouth 2 (two) times a day (Patient not taking: Reported on 1/23/2025)       No current facility-administered medications on file prior to visit.         Objective  /78 (BP Location: Left arm, Patient Position: Sitting, Cuff Size: Standard)   Pulse 90   Temp 97.5 °F (36.4 °C) (Tympanic)   Resp 18   Ht 5' 5\" (1.651 m)   Wt 91.2 kg (201 lb)   SpO2 98%   BMI 33.45 kg/m²      Physical Exam  General- appears well, no distress  CV- RRR, no murmur  Pulm- CTA bilatearlly  Abdomen- Nondistended, soft, BS present, (+) mild periumbilical TTP  "

## 2025-01-23 NOTE — PATIENT INSTRUCTIONS
Scheduled date of EGD/colonoscopy (as of today):2/11/2025  Physician performing EGD/colonoscopy:Dr Angel  Location of EGD/colonoscopy:[facility]  Desired bowel prep reviewed with patient:Miralax/Dulcolax  Instructions reviewed with patient by:Lorri SANDERS  Clearances:

## 2025-02-11 ENCOUNTER — HOSPITAL ENCOUNTER (OUTPATIENT)
Dept: GASTROENTEROLOGY | Facility: HOSPITAL | Age: 35
Setting detail: OUTPATIENT SURGERY
Discharge: HOME | End: 2025-02-11
Payer: COMMERCIAL

## 2025-02-11 ENCOUNTER — ANESTHESIA (OUTPATIENT)
Dept: GASTROENTEROLOGY | Facility: HOSPITAL | Age: 35
End: 2025-02-11
Payer: COMMERCIAL

## 2025-02-11 ENCOUNTER — ANESTHESIA EVENT (OUTPATIENT)
Dept: GASTROENTEROLOGY | Facility: HOSPITAL | Age: 35
End: 2025-02-11
Payer: COMMERCIAL

## 2025-02-11 VITALS
HEART RATE: 66 BPM | SYSTOLIC BLOOD PRESSURE: 115 MMHG | HEIGHT: 65 IN | WEIGHT: 203.04 LBS | TEMPERATURE: 97.2 F | OXYGEN SATURATION: 97 % | RESPIRATION RATE: 21 BRPM | DIASTOLIC BLOOD PRESSURE: 66 MMHG | BODY MASS INDEX: 33.83 KG/M2

## 2025-02-11 DIAGNOSIS — R11.14 BILIOUS VOMITING WITH NAUSEA: ICD-10-CM

## 2025-02-11 DIAGNOSIS — K52.9 CHRONIC DIARRHEA: ICD-10-CM

## 2025-02-11 DIAGNOSIS — G89.29 CHRONIC UPPER ABDOMINAL PAIN: ICD-10-CM

## 2025-02-11 DIAGNOSIS — R10.10 CHRONIC UPPER ABDOMINAL PAIN: ICD-10-CM

## 2025-02-11 LAB
EXT PREGNANCY TEST URINE: NEGATIVE
EXT. CONTROL: NORMAL

## 2025-02-11 PROCEDURE — 88305 TISSUE EXAM BY PATHOLOGIST: CPT | Performed by: PATHOLOGY

## 2025-02-11 PROCEDURE — 43239 EGD BIOPSY SINGLE/MULTIPLE: CPT | Performed by: INTERNAL MEDICINE

## 2025-02-11 PROCEDURE — 81025 URINE PREGNANCY TEST: CPT | Performed by: ANESTHESIOLOGY

## 2025-02-11 PROCEDURE — 45380 COLONOSCOPY AND BIOPSY: CPT | Performed by: INTERNAL MEDICINE

## 2025-02-11 RX ORDER — SODIUM CHLORIDE, SODIUM LACTATE, POTASSIUM CHLORIDE, CALCIUM CHLORIDE 600; 310; 30; 20 MG/100ML; MG/100ML; MG/100ML; MG/100ML
50 INJECTION, SOLUTION INTRAVENOUS CONTINUOUS
Status: DISCONTINUED | OUTPATIENT
Start: 2025-02-11 | End: 2025-02-15 | Stop reason: HOSPADM

## 2025-02-11 RX ORDER — PROPOFOL 10 MG/ML
INJECTION, EMULSION INTRAVENOUS AS NEEDED
Status: DISCONTINUED | OUTPATIENT
Start: 2025-02-11 | End: 2025-02-11

## 2025-02-11 RX ORDER — LIDOCAINE HYDROCHLORIDE 20 MG/ML
INJECTION, SOLUTION EPIDURAL; INFILTRATION; INTRACAUDAL; PERINEURAL AS NEEDED
Status: DISCONTINUED | OUTPATIENT
Start: 2025-02-11 | End: 2025-02-11

## 2025-02-11 RX ADMIN — LIDOCAINE HYDROCHLORIDE 100 MG: 20 INJECTION, SOLUTION EPIDURAL; INFILTRATION; INTRACAUDAL at 08:33

## 2025-02-11 RX ADMIN — PROPOFOL 100 MG: 10 INJECTION, EMULSION INTRAVENOUS at 08:34

## 2025-02-11 RX ADMIN — PROPOFOL 50 MG: 10 INJECTION, EMULSION INTRAVENOUS at 08:38

## 2025-02-11 RX ADMIN — SODIUM CHLORIDE, SODIUM LACTATE, POTASSIUM CHLORIDE, AND CALCIUM CHLORIDE 50 ML/HR: .6; .31; .03; .02 INJECTION, SOLUTION INTRAVENOUS at 07:44

## 2025-02-11 RX ADMIN — PROPOFOL 50 MG: 10 INJECTION, EMULSION INTRAVENOUS at 08:43

## 2025-02-11 RX ADMIN — PROPOFOL 50 MG: 10 INJECTION, EMULSION INTRAVENOUS at 08:46

## 2025-02-11 RX ADMIN — PROPOFOL 65 MG: 10 INJECTION, EMULSION INTRAVENOUS at 08:35

## 2025-02-11 RX ADMIN — PROPOFOL 35 MG: 10 INJECTION, EMULSION INTRAVENOUS at 08:40

## 2025-02-11 NOTE — ANESTHESIA PREPROCEDURE EVALUATION
Procedure:  COLONOSCOPY  EGD    Relevant Problems   NEURO/PSYCH   (+) Headache      Fibromyalgia    Meningitis    Shingles x2; 16 and 26   Seasonal allergies    IBS (irritable bowel syndrome) IBS-D     Physical Exam    Airway    Mallampati score: II  TM Distance: >3 FB  Neck ROM: full     Dental       Cardiovascular  Cardiovascular exam normal    Pulmonary  Pulmonary exam normal     Other Findings  post-pubertal.      Anesthesia Plan  ASA Score- 2     Anesthesia Type- IV sedation with anesthesia with ASA Monitors.         Additional Monitors:     Airway Plan:            Plan Factors-Exercise tolerance (METS): >4 METS.    Chart reviewed. EKG reviewed. Imaging results reviewed. Existing labs reviewed. Patient summary reviewed.                  Induction- intravenous.    Postoperative Plan-         Informed Consent- Anesthetic plan and risks discussed with patient.  I personally reviewed this patient with the CRNA. Discussed and agreed on the Anesthesia Plan with the CRNA..      NPO Status:  Vitals Value Taken Time   Date of last liquid 02/10/25 02/11/25 0730   Time of last liquid 1900 02/11/25 0730   Date of last solid 02/10/25 02/11/25 0730   Time of last solid 1300 02/11/25 0730

## 2025-02-11 NOTE — H&P
History and Physical - [facility]  Sara Villalta 34 y.o. female MRN: [id number]                  HPI: Sara Villalta is a 34 y.o. year old female who presents for EGD/colonoscopy for abdominal vomiting and diarrhea      REVIEW OF SYSTEMS: Per the HPI, and otherwise unremarkable.    Historical Information   Past Medical History:   Diagnosis Date    Fibromyalgia     IBS (irritable bowel syndrome)     IBS-D    Meningitis     Seasonal allergies     Shingles     x2; 16 and 26     Past Surgical History:   Procedure Laterality Date    ANKLE SURGERY      COLONOSCOPY      EGD      HAND FRACTURE REPAIR      HERNIA REPAIR       Social History   Social History     Substance and Sexual Activity   Alcohol Use No     Social History     Substance and Sexual Activity   Drug Use No     Social History     Tobacco Use   Smoking Status Former    Current packs/day: 0.00    Types: Cigarettes    Quit date:     Years since quittin.1   Smokeless Tobacco Never     Family History   Problem Relation Age of Onset    No Known Problems Mother     No Known Problems Father     No Known Problems Sister     No Known Problems Brother     No Known Problems Maternal Grandmother     No Known Problems Maternal Grandfather     No Known Problems Paternal Grandmother     No Known Problems Paternal Grandfather     No Known Problems Maternal Aunt     No Known Problems Maternal Uncle     No Known Problems Paternal Aunt     No Known Problems Paternal Uncle     No Known Problems Cousin     No Known Problems Other        Meds/Allergies       Current Outpatient Medications:     fluticasone (FLONASE) 50 mcg/act nasal spray    omeprazole (PriLOSEC) 40 MG capsule    amoxicillin (AMOXIL) 875 mg tablet    doxycycline hyclate (VIBRA-TABS) 100 mg tablet    Current Facility-Administered Medications:     lactated ringers infusion, 50 mL/hr, Intravenous, Continuous, 50 mL/hr at 25 0744    Allergies   Allergen Reactions    Bactrim  "[Sulfamethoxazole-Trimethoprim] Other (See Comments)     Patient reported that she developed ear pain which led to ear infection and aseptic meningitis.     Sulfa Antibiotics Other (See Comments)     Patient reported that she developed ear pain which led to ear infection and aseptic meningitis.          Objective     /63   Pulse 66   Temp (!) 97.1 °F (36.2 °C) (Temporal)   Resp 16   Ht 5' 5\" (1.651 m)   Wt 92.1 kg (203 lb 0.7 oz)   SpO2 99%   BMI 33.79 kg/m²       PHYSICAL EXAM    Gen: NAD  Head: NCAT  CV: RRR  CHEST: Clear  ABD: soft, NT/ND  EXT: no edema      ASSESSMENT/PLAN:  Sara Villalta is a 34 y.o. year old female who presents for EGD/colonoscopy for abdominal vomiting and diarrhea The patient is stable and optimized for the procedure, we reviewed risk and benefits. Risk include but not limited to infection, bleeding, perforation and missing a lesion.          "

## 2025-02-12 ENCOUNTER — RESULTS FOLLOW-UP (OUTPATIENT)
Dept: GASTROENTEROLOGY | Facility: CLINIC | Age: 35
End: 2025-02-12

## 2025-02-12 PROCEDURE — 88305 TISSUE EXAM BY PATHOLOGIST: CPT | Performed by: PATHOLOGY
